# Patient Record
Sex: FEMALE | Race: WHITE | Employment: FULL TIME | URBAN - METROPOLITAN AREA
[De-identification: names, ages, dates, MRNs, and addresses within clinical notes are randomized per-mention and may not be internally consistent; named-entity substitution may affect disease eponyms.]

---

## 2017-01-26 DIAGNOSIS — G44.209 TENSION HEADACHE: Primary | ICD-10-CM

## 2017-01-26 RX ORDER — HYDROCODONE BITARTRATE AND ACETAMINOPHEN 5; 325 MG/1; MG/1
TABLET ORAL
Qty: 20 TABLET | Refills: 0 | Status: SHIPPED | OUTPATIENT
Start: 2017-01-26 | End: 2017-03-23

## 2017-01-26 NOTE — TELEPHONE ENCOUNTER
Norco      Last Written Prescription Date:  12/06/2016  Last Fill Quantity: 20,   # refills: 0  Last Office Visit with [facility], [facility] or [facility] prescribing provider: 07/06/2016  Future Office visit:       Routing refill request to provider for review/approval because:  Drug not on the [facility], [facility] or [facility] refill protocol or controlled substance    Viv Archibald, [facility]  Float Technician  Nehemias Moralez

## 2017-02-03 DIAGNOSIS — F41.0 PANIC DISORDER WITHOUT AGORAPHOBIA: Primary | ICD-10-CM

## 2017-02-03 RX ORDER — LORAZEPAM 0.5 MG/1
0.5 TABLET ORAL EVERY 8 HOURS PRN
Qty: 30 TABLET | Refills: 0 | Status: SHIPPED | OUTPATIENT
Start: 2017-02-03 | End: 2017-07-18

## 2017-02-03 NOTE — TELEPHONE ENCOUNTER
Script faxed over to the [facility] in [place].    Dayana Bowling, [facility]

## 2017-02-24 ENCOUNTER — TELEPHONE (OUTPATIENT)
Dept: FAMILY MEDICINE | Facility: CLINIC | Age: 52
End: 2017-02-24

## 2017-02-24 DIAGNOSIS — F33.0 MAJOR DEPRESSIVE DISORDER, RECURRENT EPISODE, MILD (H): Primary | ICD-10-CM

## 2017-02-24 NOTE — LETTER
[facility]  [address] [id number] [phone]      February 24, 2017      Linda Terrell  [address] [place] [id number]        Dear Linda,     As part of [place]'s commitment to health and wellness we have recently reviewed your chart and your medical record indicates that you are due for one or more of the following:    -- Questionnaires for depression and anxiety. Please fill out the attached questionnaires and send them back to us in the stamped envelope provided. These questions are about your depression and how you have been feeling in the last 2 weeks. We need these forms updated in your chart in order to continue to fill your medication. The score of these questionnaires helps to determine if your medications are working well. Thank you in advance for filling them out.    -- Enclosed is a Depression Action Plan. Please read through it and keep it for future reference.     -- Physical / Pap smear. The last pap that we have on file for you was from 2/6/2008. These are recommended every 3 years. Please call our clinic to schedule your pap smear / physical appointment. Also, plan to come fasting 8-10 hours for blood work.     -- Mammogram. Please call one of the following numbers to schedule:  [facility] [phone]  [facility] [phone]  [facility] [phone]  [facility] [phone]  [facility] [phone]  [facility] [phone]    -- Colonoscopy or FIT test. Please call one of the following numbers to schedule a colonoscopy:  [facility] [phone]  [facility] [phone]  [facility] [phone]  [facility] [phone] (multiple sites, call for locations)    A colonoscopy is the gold standard but if you are reluctant to do this there is a less invasive and less expensive alternative. FIT (fecal immunochemical testing) is a screening option that is performed on a yearly  basis. This is a test to check for blood in the stool, which can be performed in the comfort of your own home. If you are willing to perform this test, we can order the kit for you to  at our lab. When you have completed the test, you simply mail it in the pre-addressed envelope.    Please call us at [phone] if you need an order for a colonoscopy or FIT testing.      Please try to schedule and/or complete the tests above within the next 2-4 weeks.   The number to call to schedule an appointment at [facility] is [phone].    While we work hard to maintain accurate records on all our patients, it is always possible that this notice does not accurately reflect tests that you may have had. To ensure that we do not continue to send you notices please verify, at your next visit or by a [employer] message, that we have accurate dates of your tests, even if these were done many years ago.     Working together for your health is our goal.    Thank you for choosing [facility] for your healthcare needs.      Sincerely,     REBECCA Conrad / brian

## 2017-02-24 NOTE — LETTER
My Depression Action Plan  Name: Linda Terrell   Date of Birth 1965  Date: 2/24/2017    My doctor: Laquita Suresh   My clinic: [address] [address] [phone]          GREEN    ZONE   Good Control    What it looks like:     Things are going generally well. You have normal up s and down s. You may even feel depressed from time to time, but bad moods usually last less than a day.   What you need to do:  1. Continue to care for yourself (see self care plan)  2. Check your depression survival kit and update it as needed  3. Follow your physician s recommendations including any medication.  4. Do not stop taking medication unless you consult with your physician first.           YELLOW         ZONE Getting Worse    What it looks like:     Depression is starting to interfere with your life.     It may be hard to get out of bed; you may be starting to isolate yourself from others.    Symptoms of depression are starting to last most all day and this has happened for several days.     You may have suicidal thoughts but they are not constant.   What you need to do:     1. Call your care team, your response to treatment will improve if you keep your care team informed of your progress. Yellow periods are signs an adjustment may need to be made.     2. Continue your self-care, even if you have to fake it!    3. Talk to someone in your support network    4. Open up your depression survival kit           RED    ZONE Medical Alert - Get Help    What it looks like:     Depression is seriously interfering with your life.     You may experience these or other symptoms: You can t get out of bed most days, can t work or engage in other necessary activities, you have trouble taking care of basic hygiene, or basic responsibilities, thoughts of suicide or death that will not go away, self-injurious behavior.     What you need to do:  1. Call your care team  and request a same-day appointment. If they are not available (weekends or after hours) call your local crisis line, emergency room or 911.          Depression Self Care Plan / Survival Kit    Self-Care for Depression  Here s the deal. Your body and mind are really not as separate as most people think.  What you do and think affects how you feel and how you feel influences what you do and think. This means if you do things that people who feel good do, it will help you feel better.  Sometimes this is all it takes.  There is also a place for medication and therapy depending on how severe your depression is, so be sure to consult with your medical provider and/ or Behavioral Health Consultant if your symptoms are worsening or not improving.     In order to better manage my stress, I will:    Exercise  Get some form of exercise, every day. This will help reduce pain and release endorphins, the  feel good  chemicals in your brain. This is almost as good as taking antidepressants!  This is not the same as joining a gym and then never going! (they count on that by the way ) It can be as simple as just going for a walk or doing some gardening, anything that will get you moving.      Hygiene   Maintain good hygiene (Get out of bed in the morning, Make your bed, Brush your teeth, Take a shower, and Get dressed like you were going to work, even if you are unemployed).  If your clothes don't fit try to get ones that do.    Diet  I will strive to eat foods that are good for me, drink plenty of water, and avoid excessive sugar, caffeine, alcohol, and other mood-altering substances.  Some foods that are helpful in depression are: complex carbohydrates, B vitamins, flaxseed, fish or fish oil, fresh fruits and vegetables.    Psychotherapy  I agree to participate in Individual Therapy (if recommended).    Medication  If prescribed medications, I agree to take them.  Missing doses can result in serious side effects.  I understand  that drinking alcohol, or other illicit drug use, may cause potential side effects.  I will not stop my medication abruptly without first discussing it with my provider.    Staying Connected With Others  I will stay in touch with my friends, family members, and my primary care provider/team.    Use your imagination  Be creative.  We all have a creative side; it doesn t matter if it s oil painting, sand castles, or mud pies! This will also kick up the endorphins.    Witness Beauty  (AKA stop and smell the roses) Take a look outside, even in mid-winter. Notice colors, textures. Watch the squirrels and birds.     Service to others  Be of service to others.  There is always someone else in need.  By helping others we can  get out of ourselves  and remember the really important things.  This also provides opportunities for practicing all the other parts of the program.    Humor  Laugh and be silly!  Adjust your TV habits for less news and crime-drama and more comedy.    Control your stress  Try breathing deep, massage therapy, biofeedback, and meditation. Find time to relax each day.     My support system    Clinic Contact:  Phone number:    Contact 1:  Phone number:    Contact 2:  Phone number:    Muslim/:  Phone number:    Therapist:  Phone number:    Local crisis center:    Phone number:    Other community support:  Phone number:

## 2017-02-24 NOTE — TELEPHONE ENCOUNTER
Panel Management Review      Patient has the following on her problem list:     Depression / Dysthymia review  PHQ-9 SCORE 9/28/2015 12/2/2015 7/6/2016   Total Score - - -   Total Score 14 6 5      Patient is due for:  PHQ9 and DAP      Composite cancer screening  Chart review shows that this patient is due/due soon for the following Pap Smear, Mammogram and Colonoscopy  Summary:    Patient is due/failing the following:   Lipid, colonoscopy, mammo, pap, DAP, PHQ9, GAD7    Action needed:   Patient needs to do PHQ9 and GAD7, mammo, colonoscopy, DAP, physical/pap with fasting labs    Type of outreach:    Sent letter. - patient does not read her [employer] messages    Questions for provider review:    None                                                                                                                                    Maryellen MCCAULEY       Chart routed to none .

## 2017-03-23 DIAGNOSIS — G44.209 TENSION HEADACHE: ICD-10-CM

## 2017-03-23 RX ORDER — HYDROCODONE BITARTRATE AND ACETAMINOPHEN 5; 325 MG/1; MG/1
TABLET ORAL
Qty: 20 TABLET | Refills: 0 | Status: SHIPPED | OUTPATIENT
Start: 2017-03-23 | End: 2017-05-30

## 2017-03-23 NOTE — TELEPHONE ENCOUNTER
Norco      Last Written Prescription Date:  01/26/17  Last Fill Quantity: 20,   # refills: 0  Last Office Visit with [facility], [facility] or [facility] prescribing provider: 07/06/16  Future Office visit:       Routing refill request to provider for review/approval because:  Drug not on the [facility], [facility] or [facility] refill protocol or controlled substance

## 2017-03-24 NOTE — TELEPHONE ENCOUNTER
Script walked over to the [facility].    Dayana Bowling, [facility]

## 2017-04-24 ENCOUNTER — MYC REFILL (OUTPATIENT)
Dept: FAMILY MEDICINE | Facility: CLINIC | Age: 52
End: 2017-04-24

## 2017-04-24 DIAGNOSIS — F51.01 PRIMARY INSOMNIA: ICD-10-CM

## 2017-04-24 RX ORDER — AMITRIPTYLINE HYDROCHLORIDE 75 MG/1
75 TABLET ORAL AT BEDTIME
Qty: 90 TABLET | Refills: 1 | Status: SHIPPED | OUTPATIENT
Start: 2017-04-24 | End: 2017-08-11

## 2017-04-24 NOTE — TELEPHONE ENCOUNTER
Prescription approved per [facility] Refill Protocol.  Cheryl Hernandez RN

## 2017-04-24 NOTE — TELEPHONE ENCOUNTER
Message from Ket:  Original authorizing provider: OMAR SWIFT NP, APRN CNP    Linda Nidhi would like a refill of the following medications:  amitriptyline (ELAVIL) 75 MG tablet [OMAR SWIFT NP, APRN CNP]    Preferred pharmacy: [facility] [address]    Comment:

## 2017-05-27 ENCOUNTER — HEALTH MAINTENANCE LETTER (OUTPATIENT)
Age: 52
End: 2017-05-27

## 2017-05-30 DIAGNOSIS — G44.209 TENSION HEADACHE: ICD-10-CM

## 2017-05-30 RX ORDER — HYDROCODONE BITARTRATE AND ACETAMINOPHEN 5; 325 MG/1; MG/1
TABLET ORAL
Qty: 20 TABLET | Refills: 0 | Status: SHIPPED | OUTPATIENT
Start: 2017-05-30 | End: 2017-08-09

## 2017-05-30 NOTE — TELEPHONE ENCOUNTER
norco 5-325mg      Last Written Prescription Date:  03/23/17  Last Fill Quantity: 20,   # refills: 0  Last Office Visit with [facility], [facility] or [facility] prescribing provider: 07/06/16  Future Office visit:       Routing refill request to provider for review/approval because:  Drug not on the [facility], [facility] or [facility] refill protocol or controlled substance      On behalf of [facility]  Thank You~  Kathy Varghese CPhT  [facility]

## 2017-07-18 DIAGNOSIS — F41.0 PANIC DISORDER WITHOUT AGORAPHOBIA: ICD-10-CM

## 2017-07-18 RX ORDER — LORAZEPAM 0.5 MG/1
0.5 TABLET ORAL EVERY 8 HOURS PRN
Qty: 30 TABLET | Refills: 0 | Status: SHIPPED | OUTPATIENT
Start: 2017-07-18 | End: 2018-05-15

## 2017-07-18 NOTE — TELEPHONE ENCOUNTER
Script walked over to the [facility].    Dayana Bowling, [facility]

## 2017-07-18 NOTE — TELEPHONE ENCOUNTER
Lorazepam 0.5mg      Last Written Prescription Date:  2/3/17  Last Fill Quantity: 30,   # refills: 0  Last Office Visit with [facility], [facility] or [facility] prescribing provider: 7/6/16  Future Office visit:       Routing refill request to provider for review/approval because:  Drug not on the [facility], [facility] or [facility] refill protocol or controlled substance      Shabnam Herman CPhT  [id number] [address] [place] [address]  Phone: [phone]  Fax: [phone]

## 2017-08-09 ENCOUNTER — MYC REFILL (OUTPATIENT)
Dept: FAMILY MEDICINE | Facility: CLINIC | Age: 52
End: 2017-08-09

## 2017-08-09 DIAGNOSIS — G44.209 TENSION HEADACHE: ICD-10-CM

## 2017-08-09 NOTE — TELEPHONE ENCOUNTER
Message from Ket:  Original authorizing provider: LAQUITA SWIFT NP, APRN CNP    Linda Terrell would like a refill of the following medications:  HYDROcodone-acetaminophen (NORCO) 5-325 MG per tablet [LAQUITA SWIFT NP, APRN CNP]    Preferred pharmacy: [facility] [address]    Comment:  Appointment scheduled to see Laquita Friday 8/11/17 @ 3:20.

## 2017-08-10 DIAGNOSIS — G44.209 TENSION HEADACHE: ICD-10-CM

## 2017-08-10 RX ORDER — HYDROCODONE BITARTRATE AND ACETAMINOPHEN 5; 325 MG/1; MG/1
TABLET ORAL
Qty: 20 TABLET | Refills: 0 | Status: SHIPPED | OUTPATIENT
Start: 2017-08-10 | End: 2017-08-10

## 2017-08-10 RX ORDER — HYDROCODONE BITARTRATE AND ACETAMINOPHEN 5; 325 MG/1; MG/1
TABLET ORAL
Qty: 20 TABLET | Refills: 0 | Status: SHIPPED | OUTPATIENT
Start: 2017-08-10 | End: 2017-08-11

## 2017-08-10 NOTE — TELEPHONE ENCOUNTER
Hydrocodone/Apap 5/325mg      Last Written Prescription Date:  8/10/17  Last Fill Quantity: 20,   # refills: 0  Last Office Visit with [facility], [facility] or [facility] prescribing provider: 7/6/17  Future Office visit:    Next 5 appointments (look out 90 days)     Aug 11, 2017  3:20 PM CDT   Freya Lane with AUBREY Mendes CNP   [facility]    [address] [id number] [phone]                   Routing refill request to provider for review/approval because:  Drug not on the [facility], [facility] or [facility] refill protocol or controlled substance      Shabnam Herman CPhT  [id number])  [address] [place] [address]  Phone: [phone]  Fax: [phone]

## 2017-08-10 NOTE — TELEPHONE ENCOUNTER
Script walked over to the [facility].    Dayana Bowling, [facility]

## 2017-08-11 ENCOUNTER — OFFICE VISIT (OUTPATIENT)
Dept: FAMILY MEDICINE | Facility: CLINIC | Age: 52
End: 2017-08-11
Payer: COMMERCIAL

## 2017-08-11 VITALS
HEART RATE: 64 BPM | HEIGHT: 64 IN | WEIGHT: 140.6 LBS | TEMPERATURE: 97.8 F | BODY MASS INDEX: 24.01 KG/M2 | SYSTOLIC BLOOD PRESSURE: 134 MMHG | DIASTOLIC BLOOD PRESSURE: 82 MMHG

## 2017-08-11 DIAGNOSIS — Z11.59 NEED FOR HEPATITIS C SCREENING TEST: ICD-10-CM

## 2017-08-11 DIAGNOSIS — R73.09 ELEVATED GLUCOSE: ICD-10-CM

## 2017-08-11 DIAGNOSIS — Z12.11 SPECIAL SCREENING FOR MALIGNANT NEOPLASMS, COLON: ICD-10-CM

## 2017-08-11 DIAGNOSIS — F11.90 CHRONIC NARCOTIC USE: ICD-10-CM

## 2017-08-11 DIAGNOSIS — G44.209 TENSION HEADACHE: Primary | ICD-10-CM

## 2017-08-11 DIAGNOSIS — Z13.6 CARDIOVASCULAR SCREENING; LDL GOAL LESS THAN 160: ICD-10-CM

## 2017-08-11 DIAGNOSIS — Z12.31 VISIT FOR SCREENING MAMMOGRAM: ICD-10-CM

## 2017-08-11 DIAGNOSIS — F51.01 PRIMARY INSOMNIA: ICD-10-CM

## 2017-08-11 DIAGNOSIS — S46.819D STRAIN OF TRAPEZIUS MUSCLE, UNSPECIFIED LATERALITY, SUBSEQUENT ENCOUNTER: ICD-10-CM

## 2017-08-11 PROCEDURE — 99214 OFFICE O/P EST MOD 30 MIN: CPT | Performed by: NURSE PRACTITIONER

## 2017-08-11 RX ORDER — AMITRIPTYLINE HYDROCHLORIDE 75 MG/1
75 TABLET ORAL AT BEDTIME
Qty: 90 TABLET | Refills: 1 | Status: SHIPPED | OUTPATIENT
Start: 2017-09-12 | End: 2018-04-26

## 2017-08-11 RX ORDER — HYDROCODONE BITARTRATE AND ACETAMINOPHEN 5; 325 MG/1; MG/1
TABLET ORAL
Qty: 20 TABLET | Refills: 0 | Status: SHIPPED | OUTPATIENT
Start: 2017-09-22 | End: 2017-11-16

## 2017-08-11 ASSESSMENT — ANXIETY QUESTIONNAIRES
6. BECOMING EASILY ANNOYED OR IRRITABLE: NOT AT ALL
5. BEING SO RESTLESS THAT IT IS HARD TO SIT STILL: NOT AT ALL
3. WORRYING TOO MUCH ABOUT DIFFERENT THINGS: SEVERAL DAYS
7. FEELING AFRAID AS IF SOMETHING AWFUL MIGHT HAPPEN: NOT AT ALL
1. FEELING NERVOUS, ANXIOUS, OR ON EDGE: SEVERAL DAYS
2. NOT BEING ABLE TO STOP OR CONTROL WORRYING: SEVERAL DAYS
GAD7 TOTAL SCORE: 3

## 2017-08-11 ASSESSMENT — PATIENT HEALTH QUESTIONNAIRE - PHQ9
SUM OF ALL RESPONSES TO PHQ QUESTIONS 1-9: 3
5. POOR APPETITE OR OVEREATING: NOT AT ALL

## 2017-08-11 NOTE — PROGRESS NOTES
SUBJECTIVE:                                                    Linda Terrell is a 52 year old female who presents to clinic today for the following health issues:      Medication Followup of Norco    Taking Medication as prescribed: yes    Side Effects:  None    Medication Helping Symptoms:  yes     Depression and Anxiety Follow-Up    Status since last visit: Stable    Other associated symptoms:None    Complicating factors:     Significant life event: No     Current substance abuse: None    PHQ-9 SCORE 9/28/2015 12/2/2015 7/6/2016   Total Score - - -   Total Score 14 6 5     ELISA-7 SCORE 10/22/2013 7/1/2014 9/28/2015   Total Score 2 0 -   Total Score - - 17       PHQ-9  English  PHQ-9   Any Language  GAD7    Amount of exercise or physical activity: Is active but does no formal exercise    Problems taking medications regularly: No    Medication side effects: none  Diet: regular (no restrictions)         Last week had all of the symptoms of period but didn't have period.   Does have headache a week before her period.  ;will usually be behind the left eye.    This last headache was the worse headache that she has hd   Bright lights are painful.   Does have hx of tension headaches is coming a week before periods, can last for  3-4 days . but now is having a worse headache that is behind the left. Will some time   Periods are changing a little .  Did miss period last month but did have all of the symptoms   For the  Headache will take a lot of Ibuprofen .  Hot rice pack to the back of the neck   When at home has used 1/2 Norco and that will help to stop the headache    Lately has been over thinking things.  Daughter had a second surgery for her thyroid cancer  Is stressed out.   Eats well doesn't drink much caffeine ,  Is staying away from triggers ( chocolate )     Depression and anxiety is well controlled   Has been a tough year but is going well    Problem list and histories reviewed & adjusted, as  indicated.  Additional history: as documented    Patient Active Problem List   Diagnosis     INTERMITTENTLY ELEVATED BP WITH SWEATS     Panic disorder without agoraphobia     Uterovaginal prolapse, incomplete     CARDIOVASCULAR SCREENING; LDL GOAL LESS THAN 160     24 hour contact handout given     Tension headache     Insomnia     SI (sacroiliac) joint dysfunction     Elevated glucose     Major depressive disorder, recurrent episode, mild (H)     Adjustment disorder with depressed mood     Panic attack as reaction to stress     Chronic pain     Past Surgical History:   Procedure Laterality Date     BUNIONECTOMY RT/LT      left     C TMJ UNSPECIFIED THERAPY      surgery on jaw     TUBAL LIGATION         Social History   Substance Use Topics     Smoking status: Never Smoker     Smokeless tobacco: Never Used     Alcohol use Yes      Comment: occasional     Family History   Problem Relation Age of Onset     Breast Cancer Mother      Hypertension Father      CEREBROVASCULAR DISEASE Paternal Grandfather      Respiratory Paternal Grandmother      Emphysema     C.A.D. No family hx of      DIABETES No family hx of      Cancer - colorectal No family hx of      Prostate Cancer No family hx of          Current Outpatient Prescriptions   Medication Sig Dispense Refill     HYDROcodone-acetaminophen (NORCO) 5-325 MG per tablet Take 1 tablet at onset of   severe tension headache,  Taking no more than 2 per headache  Try to make them last  For 6-8 weeks  Last refill until seen -8/10/17. Due for physical 20 tablet 0     LORazepam (ATIVAN) 0.5 MG tablet Take 1 tablet (0.5 mg) by mouth every 8 hours as needed for anxiety Use sparingly  Over due for appointment 30 tablet 0     amitriptyline (ELAVIL) 75 MG tablet Take 1 tablet (75 mg) by mouth At Bedtime 90 tablet 1     sertraline (ZOLOFT) 50 MG tablet 1 1/2 tablets the week before period  And 1 tablet daily the rest of the month 34 tablet 6     Allergies   Allergen Reactions      "Codeine Itching     Can take vicodin.      Imitrex [Sumatriptan] Other (See Comments)     Makes her headaches worse.      BP Readings from Last 3 Encounters:   08/11/17 134/82   07/06/16 115/70   02/09/16 135/89    Wt Readings from Last 3 Encounters:   08/11/17 140 lb 9.6 oz (63.8 kg)   07/06/16 145 lb (65.8 kg)   02/09/16 148 lb 3.2 oz (67.2 kg)                        Reviewed and updated as needed this visit by clinical staffTobacco  Allergies  Meds  Med Hx  Surg Hx  Fam Hx  Soc Hx      Reviewed and updated as needed this visit by Provider  Tobacco  Allergies  Meds  Med Hx  Surg Hx  Fam Hx  Soc Hx        ROS:  C: NEGATIVE for fever, chills, change in weight  E/M: NEGATIVE for ear, mouth and throat problems  R: NEGATIVE for significant cough or SOB  CV: NEGATIVE for chest pain, palpitations or peripheral edema   POSITIVE miss period,  This is the age that mother went into menopause   GI: NEGATIVE for nausea, abdominal pain, heartburn, or change in bowel habits  NEURO: POSITIVE for will have headache the week before her period.  Mostly on the left side   Feels that lately her headaches are more intense doesn't want to have a CT scan   [place] will take away the headache  PSYCHIATRIC: POSITIVE for stress related to daughter's thyroid cancer.  Just had a second surgery   Is going through a divorce .    Personally she is doing OK     OBJECTIVE:     /82 (BP Location: Right arm, Patient Position: Chair, Cuff Size: Adult Regular)  Pulse 64  Temp 97.8  F (36.6  C) (Oral)  Ht 5' 4.25\" (1.632 m)  Wt 140 lb 9.6 oz (63.8 kg)  BMI 23.95 kg/m2  Body mass index is 23.95 kg/(m^2).   GENERAL: healthy, alert and no distress  HENT: ear canals and TM's normal, nose and mouth without ulcers or lesions  NECK: no adenopathy, no asymmetry, masses, or scars and thyroid normal to palpation  RESP: lungs clear to auscultation - no rales, rhonchi or wheezes  CV: regular rate and rhythm, normal S1 S2, no S3 or S4, no " murmur, click or rub, no peripheral edema and peripheral pulses strong  MS: the trap muscle are very tight  The  Cervical muscles are also very tight and knotted    NEURO: Normal strength and tone, mentation intact and speech normal  PSYCH: affect normal/bright, anxious, appearance well groomed and will talk in circles.       Diagnostic Test Results:  none     ASSESSMENT/PLAN:     ASSESSMENT/PLAN:      ICD-10-CM    1. Tension headache G44.209 [facility] REFERRAL     HYDROcodone-acetaminophen (NORCO) 5-325 MG per tablet   2. Need for hepatitis C screening test Z11.59 **Hepatitis C Screen Reflex to RNA FUTURE anytime   3. CARDIOVASCULAR SCREENING; LDL GOAL LESS THAN 160 Z13.6 Lipid panel reflex to direct LDL   4. Visit for screening mammogram Z12.31 MA Screening Digital Bilateral   5. Special screening for malignant neoplasms, colon Z12.11 Fecal colorectal cancer screen (FIT)   6. Elevated glucose R73.09 Basic metabolic panel  (Ca, Cl, CO2, Creat, Gluc, K, Na, BUN)     Hemoglobin A1c   7. Strain of trapezius muscle, unspecified laterality, subsequent encounter S46.819D [facility], [facility] CH[facility]RACT[facility] REFERRAL   8. Chronic narcotic use F11.90 Drug Abuse Screen Panel 13, Urine (Pain Care Package)   9. Primary insomnia F51.01 amitriptyline (ELAVIL) 75 MG tablet       Patient Instructions   It does sound like you are going into menopause .   Stay well hydrated - urine should be clear.    Exercise 30-60 min daily   Your menopause generally will be close to what your mother's is.     For the headaches do go to  [facility] physical therapy to get the muscles in the neck loosened up to decrease your headaches     You will need to make a lab only appointment,  [phone].  Do this by  Aug 24, 2017 .,  Please be fasting                    MEDICATIONS:  Continue current medications without change  Work on weight loss  Regular exercise  See Patient Instructions    OMAR SWIFT NP, APRN [facility]  [facility]

## 2017-08-11 NOTE — NURSING NOTE
"Chief Complaint   Patient presents with     Recheck Medication     Norco       Initial /82 (BP Location: Right arm, Patient Position: Chair, Cuff Size: Adult Regular)  Pulse 64  Temp 97.8  F (36.6  C) (Oral)  Ht 5' 4.25\" (1.632 m)  Wt 140 lb 9.6 oz (63.8 kg)  BMI 23.95 kg/m2 Estimated body mass index is 23.95 kg/(m^2) as calculated from the following:    Height as of this encounter: 5' 4.25\" (1.632 m).    Weight as of this encounter: 140 lb 9.6 oz (63.8 kg).  Medication Reconciliation: complete     Margi Ramos CMA (AAMA)      "

## 2017-08-11 NOTE — MR AVS SNAPSHOT
After Visit Summary   8/11/2017    Linda Terrell    MRN: [id number]           Patient Information     Date Of Birth          1965        Visit Information        Provider Department      8/11/2017 3:20 PM Laquita Suresh APRN [facility]        Today's Diagnoses     Tension headache    -  1    Need for hepatitis C screening test        CARDIOVASCULAR SCREENING; LDL GOAL LESS THAN 160        Visit for screening mammogram        Special screening for malignant neoplasms, colon        Elevated glucose        Strain of trapezius muscle, unspecified laterality, subsequent encounter        Chronic narcotic use        Primary insomnia          Care Instructions    It does sound like you are going into menopause .   Stay well hydrated - urine should be clear.    Exercise 30-60 min daily   Your menopause generally will be close to what your mother's is.     For the headaches do go to  [facility] physical therapy to get the muscles in the neck loosened up to decrease your headaches     You will need to make a lab only appointment,  [phone].  Do this by  Aug 24, 2017 .,  Please be fasting             Follow-ups after your visit        Additional Services     [facility] PT, HAND, AND CHIROPRACTIC REFERRAL       **This order will print in the [facility] Scheduling Office**    Physical Therapy, Hand Therapy and Chiropractic Care are available through:    *[facility]  *[facility]  *[facility]    Call one number to schedule at any of the above locations: [phone].    Your provider has referred you to: Physical Therapy at [facility] or [facility]    Indication/Reason for Referral: trap muscle pain ,    Onset of Illness: chronic   Therapy Orders: Evaluate and Treat  Special Programs: None  Special Request: Rachelle Villaseñor      Additional Comments for the Therapist or Chiropractor:     Please be aware that coverage of these services is subject to  the terms and limitations of your health insurance plan.  Call member services at your health plan with any benefit or coverage questions.      Please bring the following to your appointment:    *Your personal calendar for scheduling future appointments  *Comfortable clothing                  Future tests that were ordered for you today     Open Future Orders        Priority Expected Expires Ordered    **Hepatitis C Screen Reflex to RNA FUTURE anytime Routine 8/11/2017 8/24/2017 8/11/2017    Lipid panel reflex to direct LDL Routine  8/24/2017 8/11/2017    MA Screening Digital Bilateral Routine  8/12/2018 8/11/2017    Fecal colorectal cancer screen (FIT) Routine 9/1/2017 11/3/2017 8/11/2017    Basic metabolic panel  (Ca, Cl, CO2, Creat, Gluc, K, Na, BUN) Routine  8/24/2017 8/11/2017    Hemoglobin A1c Routine  8/24/2017 8/11/2017            Who to contact     Normal or non-critical lab and imaging results will be communicated to you by "[employer]hart, letter or phone within 4 business days after the clinic has received the results. If you do not hear from us within 7 days, please contact the clinic through [employer] or phone. If you have a critical or abnormal lab result, we will notify you by phone as soon as possible.  Submit refill requests through [employer] or call your pharmacy and they will forward the refill request to us. Please allow 3 business days for your refill to be completed.          If you need to speak with a  for additional information , please call: [phone]           Additional Information About Your Visit        [employer] Information     [employer] gives you secure access to your electronic health record. If you see a primary care provider, you can also send messages to your care team and make appointments. If you have questions, please call your primary care clinic.  If you do not have a primary care provider, please call [phone] and they will assist you.        Care EveryWhere ID      "This is your Care EveryWhere ID. This could be used by other organizations to access your [place] medical records  [id number]        Your Vitals Were     Pulse Temperature Height BMI (Body Mass Index)          64 97.8  F (36.6  C) (Oral) 5' 4.25\" (1.632 m) 23.95 kg/m2         Blood Pressure from Last 3 Encounters:   08/11/17 134/82   07/06/16 115/70   02/09/16 135/89    Weight from Last 3 Encounters:   08/11/17 140 lb 9.6 oz (63.8 kg)   07/06/16 145 lb (65.8 kg)   02/09/16 148 lb 3.2 oz (67.2 kg)              We Performed the Following     Drug Abuse Screen Panel 13, Urine (Pain Care Package)     [facility] PT, HAND, AND CHIROPRACTIC REFERRAL          Today's Medication Changes          These changes are accurate as of: 8/11/17  4:34 PM.  If you have any questions, ask your nurse or doctor.               These medicines have changed or have updated prescriptions.        Dose/Directions    HYDROcodone-acetaminophen 5-325 MG per tablet   Commonly known as:  NORCO   This may have changed:  additional instructions   Used for:  Tension headache   Changed by:  Laquita Suresh APRN CNP        Start taking on:  9/22/2017   Take 1 tablet at onset of   severe tension headache,  Taking no more than 2 per headache  Try to make them last  For 6-8 weeks   Quantity:  20 tablet   Refills:  0            Where to get your medicines      These medications were sent to [facility] [id number] [place] [address] [address] [place] [address] [facility] [address]     Phone:  [phone]     amitriptyline 75 MG tablet         Some of these will need a paper prescription and others can be bought over the counter.  Ask your nurse if you have questions.     Bring a paper prescription for each of these medications     HYDROcodone-acetaminophen 5-325 MG per tablet                Primary Care Provider Office Phone # Fax #    AUBREY Mendes [phone] [place] [address]        Equal Access to Services     [facility] ParkFLAVIO : Hadii aad ku hadbarrieulisses Sotin, waaxda luqadaha, qaybta kaalmamichel de oliveira, kaetlin holbrook. So [facility] [phone].    ATENCIÓN: Si habla español, tiene a cleaning disposición servicios gratuitos de asistencia lingüística. [facility] [phone].    We comply with applicable federal civil rights laws and [place] laws. We do not discriminate on the basis of race, color, national origin, age, disability sex, sexual orientation or gender identity.            Thank you!     Thank you for choosing [facility]  for your care. Our goal is always to provide you with excellent care. Hearing back from our patients is one way we can continue to improve our services. Please take a few minutes to complete the written survey that you may receive in the mail after your visit with us. Thank you!             Your Updated Medication List - Protect others around you: Learn how to safely use, store and throw away your medicines at www.disposemymeds.org.          This list is accurate as of: 8/11/17  4:34 PM.  Always use your most recent med list.                   Brand Name Dispense Instructions for use Diagnosis    amitriptyline 75 MG tablet   Start taking on:  9/12/2017    ELAVIL    90 tablet    Take 1 tablet (75 mg) by mouth At Bedtime    Primary insomnia       HYDROcodone-acetaminophen 5-325 MG per tablet   Start taking on:  9/22/2017    NORCO    20 tablet    Take 1 tablet at onset of   severe tension headache,  Taking no more than 2 per headache  Try to make them last  For 6-8 weeks    Tension headache       LORazepam 0.5 MG tablet    ATIVAN    30 tablet    Take 1 tablet (0.5 mg) by mouth every 8 hours as needed for anxiety Use sparingly  Over due for appointment    Panic disorder without agoraphobia       sertraline 50 MG tablet    ZOLOFT    34 tablet    1 1/2 tablets the week before period  And 1 tablet daily  the rest of the month    Adjustment disorder with depressed mood

## 2017-08-11 NOTE — PATIENT INSTRUCTIONS
It does sound like you are going into menopause .   Stay well hydrated - urine should be clear.    Exercise 30-60 min daily   Your menopause generally will be close to what your mother's is.     For the headaches do go to  [facility] physical therapy to get the muscles in the neck loosened up to decrease your headaches     You will need to make a lab only appointment,  [phone].  Do this by  Aug 24, 2017 .,  Please be fasting

## 2017-08-11 NOTE — LETTER
[facility]    08/11/17    Patient: Linda Terrell  YOB: 1965  Medical Record Number: [id number]                                                                  Controlled Substance Agreement  I understand that my care provider has prescribed controlled substances (narcotics, tranquilizers, and/or stimulants) to help manage my condition(s).  I am taking this medicine to help me function or work.  I know that this is strong medicine.  It could have serious side effects and even cause a dependency on the drug.  If I stop these medicines suddenly, I could have severe withdrawal symptoms.    The risks, benefits, and side effects of these medication(s) were explained to me.  I agree that:  1. I will take part in other treatments as advised by my provider.  This may be psychiatry or counseling, physical therapy, behavioral therapy, group treatment, or a referral to a pain clinic.  I will reduce or stop my medicine when my provider tells me to do so.   2. I will take my medicines as prescribed.  I will not change the dose or schedule unless my provider tells me to.  There will be no refills if I  run out early.   I may be contacted at any time without warning and asked to complete a drug test or pill count.   3. I will keep all my appointments at the clinic.  If I miss appointments or fail to follow instructions, my provider may stop my medicine.  4. I will not ask other providers to prescribe controlled substances. And I will not accept controlled substances from other people. If I need another prescribed controlled substance for a new reason, I will notify my provider within one business day.  5. If I enroll in the [place] Medical Marijuana program, I will tell my provider.  I will also sign an agreement to share my medical records with my provider.  6. I will use one pharmacy to fill all of my controlled substance prescriptions.  If my prescription is mailed to my pharmacy, it may take 5  to 7 days for my medicine to be ready.  7. I understand that my provider, clinic care team, and pharmacy can track controlled substance prescriptions from other providers through a central database (prescription monitoring program).  8. I will bring in my list of medications (or my medicine bottles) each time I come to the clinic.  REV- 04/2016                                                                                                                                            Page 1 of 2      [facility]    08/11/17    Patient: Linda Terrell  YOB: 1965  Medical Record Number: [id number]    9. Refills of controlled substances will be made only during office hours.  It is up to me to make sure that I do not run out of my medicines on weekends or holidays.    10. I am responsible for my prescriptions.  If the medicine is lost or stolen, it will not be replaced.   I also agree not to share these medicines with anyone.  11. I agree to not use ANY illegal or recreational drugs.  This includes marijuana, cocaine, bath salts or other drugs.  I agree not to use alcohol unless my provider says I may.  I agree to give urine samples whenever asked.  If I fail to give a urine sample, the provider may stop my medicine.     12. I will tell my nurse or provider right away if I become pregnant or have a new medical problem treated outside of [facility].  13. I understand that this medicine can affect my thinking and judgment.  It may be unsafe for me to drive, use machinery and do dangerous tasks.  I will not do any of these things until I know how the medicine affects me.  If my dose changes, I will wait to see how it affects me.  I will contact my provider if I have concerns about medicine side effects.  I understand that if I do not follow any of the conditions above, my prescriptions or treatment may be stopped.    I agree that my provider, clinic care team, and pharmacy may work with any  city, state or federal law enforcement agency that investigates the misuse, sale, or other diversion of my controlled medicine. I will allow my provider to discuss my care with or share a copy of this agreement with any other treating provider, pharmacy or emergency room where I receive care.  I agree to give up (waive) any right of privacy or confidentiality with respect to these authorizations.   I have read this agreement and have asked questions about anything I did not understand.   ___________________________________    ___________________________  Patient Signature                                                           Date and Time  ___________________________________     ____________________________  Witness                                                                            Date and Time  ___________________________________  OMAR SWIFT NP, APRN CNP  REV-  04/2016                                                                                                                                                                 Page 2 of 2  {Controlled Substance Agreement (CSA) Patient Instructions (Optional):115393}

## 2017-08-12 ASSESSMENT — ANXIETY QUESTIONNAIRES: GAD7 TOTAL SCORE: 3

## 2017-08-29 DIAGNOSIS — F43.21 ADJUSTMENT DISORDER WITH DEPRESSED MOOD: ICD-10-CM

## 2017-08-30 NOTE — TELEPHONE ENCOUNTER
Called and spoke with patient , she states she has been taking it everyday  1 tab 50mg and states it was discussed at last appt with Kerwin to continue  Advised new RX sent to reflect how she is taking now  Will f./u at need   KAUSHIK Lo  Clinic  RN/Nehemias Moralez

## 2017-08-30 NOTE — TELEPHONE ENCOUNTER
sertraline (ZOLOFT) 50 MG tablet     Last Written Prescription Date: 7/6/16  Last Fill Quantity: 34, # refills: 6  Last Office Visit with G primary care provider:  8/11/17        Last PHQ-9 score on record=   PHQ-9 SCORE 8/11/2017   Total Score -   Total Score 3

## 2017-10-20 ENCOUNTER — TELEPHONE (OUTPATIENT)
Dept: FAMILY MEDICINE | Facility: CLINIC | Age: 52
End: 2017-10-20

## 2017-11-16 ENCOUNTER — MYC REFILL (OUTPATIENT)
Dept: FAMILY MEDICINE | Facility: CLINIC | Age: 52
End: 2017-11-16

## 2017-11-16 DIAGNOSIS — G44.209 TENSION HEADACHE: ICD-10-CM

## 2017-11-16 NOTE — TELEPHONE ENCOUNTER
Patient called wanting to know when med will be filled and if you received her mychart request.    Dayana Bowling [facility]

## 2017-11-16 NOTE — TELEPHONE ENCOUNTER
Message from HomeStayzechariaht:  Original authorizing provider: OMAR SWIFT NP, AUBREY CNP    Linda Terrell would like a refill of the following medications:  HYDROcodone-acetaminophen (NORCO) 5-325 MG per tablet [OMAR SWIFT NP, AUBREY CNP]    Preferred pharmacy: [facility] [address] [place] [address] [address] AT [facility]    Comment:

## 2017-11-17 RX ORDER — HYDROCODONE BITARTRATE AND ACETAMINOPHEN 5; 325 MG/1; MG/1
TABLET ORAL
Qty: 20 TABLET | Refills: 0 | Status: SHIPPED | OUTPATIENT
Start: 2017-11-17 | End: 2018-01-25

## 2017-11-17 RX ORDER — HYDROCODONE BITARTRATE AND ACETAMINOPHEN 5; 325 MG/1; MG/1
TABLET ORAL
Qty: 20 TABLET | Refills: 0 | Status: SHIPPED | OUTPATIENT
Start: 2017-11-17 | End: 2017-11-17

## 2017-11-17 NOTE — TELEPHONE ENCOUNTER
RX walked over to the [facility].    Dayana Bowling, [facility]

## 2017-11-30 ENCOUNTER — TELEPHONE (OUTPATIENT)
Dept: FAMILY MEDICINE | Facility: CLINIC | Age: 52
End: 2017-11-30

## 2017-11-30 NOTE — TELEPHONE ENCOUNTER
"11/30/2017      Patient Communication Preferences indicate  Do not contact  and/or communication by \"Phone\" is not preferred. Call not required per Outreach team.          Outreach ,  Louie Danielle    "

## 2018-01-25 ENCOUNTER — MYC REFILL (OUTPATIENT)
Dept: FAMILY MEDICINE | Facility: CLINIC | Age: 53
End: 2018-01-25

## 2018-01-25 DIAGNOSIS — G44.209 TENSION HEADACHE: ICD-10-CM

## 2018-01-26 RX ORDER — HYDROCODONE BITARTRATE AND ACETAMINOPHEN 5; 325 MG/1; MG/1
TABLET ORAL
Qty: 20 TABLET | Refills: 0 | Status: SHIPPED | OUTPATIENT
Start: 2018-01-26 | End: 2018-03-29

## 2018-01-26 NOTE — TELEPHONE ENCOUNTER
Message from [employer]:  Original authorizing provider: Oscar Mcguire MD    Linda Terrell would like a refill of the following medications:  HYDROcodone-acetaminophen (NORCO) 5-325 MG per tablet [Oscar Mcguire MD]    Preferred pharmacy: [facility] [address] [place] [address]  AT [facility]    Comment:

## 2018-03-29 ENCOUNTER — MYC REFILL (OUTPATIENT)
Dept: FAMILY MEDICINE | Facility: CLINIC | Age: 53
End: 2018-03-29

## 2018-03-29 DIAGNOSIS — G44.209 TENSION HEADACHE: ICD-10-CM

## 2018-03-29 RX ORDER — HYDROCODONE BITARTRATE AND ACETAMINOPHEN 5; 325 MG/1; MG/1
TABLET ORAL
Qty: 20 TABLET | Refills: 0 | OUTPATIENT
Start: 2018-03-29

## 2018-03-29 RX ORDER — HYDROCODONE BITARTRATE AND ACETAMINOPHEN 5; 325 MG/1; MG/1
TABLET ORAL
Qty: 20 TABLET | Refills: 0 | Status: SHIPPED | OUTPATIENT
Start: 2018-03-29 | End: 2018-07-09

## 2018-03-29 NOTE — TELEPHONE ENCOUNTER
Message from TicketBoxzechariaht:  Original authorizing provider: OMAR SWIFT NP, APRN CNP    Linda Terrell would like a refill of the following medications:  HYDROcodone-acetaminophen (NORCO) 5-325 MG per tablet [OMAR SWIFT NP, APRN CNP]    Preferred pharmacy: [facility] [address] [facility]    Comment:

## 2018-03-29 NOTE — TELEPHONE ENCOUNTER
Message from Magiqzechariaht:  Original authorizing provider: OMAR SWIFT NP, AUBREY CNP    Linda Terrell would like a refill of the following medications:  HYDROcodone-acetaminophen (NORCO) 5-325 MG per tablet [OMAR SWIFT NP, AUBREY CNP]    Preferred pharmacy: [facility] [address] [place] [address] [address] AT [facility]    Comment:

## 2018-03-30 ENCOUNTER — TELEPHONE (OUTPATIENT)
Dept: FAMILY MEDICINE | Facility: CLINIC | Age: 53
End: 2018-03-30

## 2018-03-30 NOTE — TELEPHONE ENCOUNTER
Received a Third Party Rejection from the [facility] for Coffey 5/325    Request routed to the [employer] epa team        Quan Marshall RT (r)  [facility]

## 2018-03-30 NOTE — TELEPHONE ENCOUNTER
Script walked over to the [facility].    Dayana Bowling, [facility]

## 2018-04-02 ENCOUNTER — TELEPHONE (OUTPATIENT)
Dept: FAMILY MEDICINE | Facility: CLINIC | Age: 53
End: 2018-04-02

## 2018-04-02 NOTE — TELEPHONE ENCOUNTER
Panel Management Review      Patient has the following on her problem list:     Depression / Dysthymia review    Measure:  Needs PHQ-9 score of 4 or less during index window.  Administer PHQ-9 and if score is 5 or more, send encounter to provider for next steps.    5 - 7 month window range: Pt not on depression roster only listed in HM.    PHQ-9 SCORE 12/2/2015 7/6/2016 8/11/2017   Total Score - - -   Total Score 6 5 3       If PHQ-9 recheck is 5 or more, route to provider for next steps.    Patient is due for:  PHQ9 and DAP      Composite cancer screening  Chart review shows that this patient is due/due soon for the following Pap Smear, Mammogram and Colonoscopy  Summary:    Patient is due/failing the following:   COLONOSCOPY, DAP, MAMMOGRAM, PAP and PHQ9    Action needed:   Patient needs office visit for Pap.    Type of outreach:    Sent Row44t message.    Questions for provider review:    None                                                                                                                                    Terri Washburn MA

## 2018-04-03 NOTE — TELEPHONE ENCOUNTER
PA Initiation    Medication: Hydrocodone/APAP 5-325 mg Tabs   Insurance Company: [employer] - Phone [phone] Fax [phone]  Pharmacy Filling the Rx[place] [facility] [place] [address] [facility]  Filling Pharmacy Phone: [phone]  Filling Pharmacy Fax:    Start Date: 4/3/2018

## 2018-04-03 NOTE — TELEPHONE ENCOUNTER
Insurance requested additional information in regards to the Prior Authorization. Form completed and faxed back.

## 2018-04-04 NOTE — TELEPHONE ENCOUNTER
Prior Authorization Approval    Authorization Effective Date: 4/4/2018  Authorization Expiration Date: 10/1/2018  Medication: Hydrocodone/APAP 5-325 mg Tabs - APPROVED   Approved Dose/Quantity:  Reference #: [id number]   Insurance Company: [employer] - Phone [phone] Fax [phone]  Expected CoPay: Not available      CoPay Card Available:      Foundation Assistance Needed:    Which Pharmacy is filling the prescription (Not needed for infusion/clinic administered): [facility] [place] - [address] [facility]  Pharmacy Notified: Yes  Patient Notified: No

## 2018-04-05 NOTE — TELEPHONE ENCOUNTER
Follow up notification with the pharmacy.      Quan Marshall RT (r)  [facility]

## 2018-04-26 DIAGNOSIS — F51.01 PRIMARY INSOMNIA: ICD-10-CM

## 2018-04-27 RX ORDER — AMITRIPTYLINE HYDROCHLORIDE 75 MG/1
TABLET ORAL
Qty: 90 TABLET | Refills: 0 | Status: SHIPPED | OUTPATIENT
Start: 2018-04-27 | End: 2018-07-23

## 2018-04-27 NOTE — TELEPHONE ENCOUNTER
"Requested Prescriptions   Pending Prescriptions Disp Refills     amitriptyline (ELAVIL) 75 MG tablet [Pharmacy Med Name: AMITRIPTYLINE 75MG TABLETS] 90 tablet 0    Last Written Prescription Date:  09/12/2017 #90 x 1  Last filled 03/26/2018  Last office visit: 8/11/2017 DU Suresh   Future Office Visit:  None   Sig: TAKE 1 TABLET(75 MG) BY MOUTH AT BEDTIME    Tricyclic Agents ( Annual appt and no PHQ9) Passed    4/26/2018  6:38 PM       Passed - Blood Pressure under 140/90 in past 12 mos    BP Readings from Last 3 Encounters:   08/11/17 134/82   07/06/16 115/70   02/09/16 135/89                Passed - Recent (12 mo) or future (30 days) visit within authorizing provider's specialty    Patient had office visit in the last 12 months or has a visit in the next 30 days with authorizing provider or within the authorizing provider's specialty.  See \"Patient Info\" tab in inbasket, or \"Choose Columns\" in Meds & Orders section of the refill encounter.           Passed - Patient is age 18 or older       Passed - Patient is not pregnant       Passed - No positive pregnancy test on record in past 12 mos          "

## 2018-04-27 NOTE — TELEPHONE ENCOUNTER
Routing refill request to provider for review/approval because:  Drug interaction warning    Marisol Eric RN

## 2018-05-15 ENCOUNTER — MYC REFILL (OUTPATIENT)
Dept: FAMILY MEDICINE | Facility: CLINIC | Age: 53
End: 2018-05-15

## 2018-05-15 DIAGNOSIS — F41.0 PANIC DISORDER WITHOUT AGORAPHOBIA: ICD-10-CM

## 2018-05-16 ENCOUNTER — MYC REFILL (OUTPATIENT)
Dept: FAMILY MEDICINE | Facility: CLINIC | Age: 53
End: 2018-05-16

## 2018-05-16 DIAGNOSIS — F41.0 PANIC DISORDER WITHOUT AGORAPHOBIA: ICD-10-CM

## 2018-05-16 NOTE — TELEPHONE ENCOUNTER
Routing refill request to provider for review/approval because:  Drug not on the [facility] refill protocol or controlled substance  PRachel Lo  Clinic  RN/Nehemias Moralez

## 2018-05-16 NOTE — TELEPHONE ENCOUNTER
Message from Carmenhart:  Original authorizing provider: OMAR SWIFT NP, APRN CNP    Linda Nidhi would like a refill of the following medications:  LORazepam (ATIVAN) 0.5 MG tablet [OMAR SWIFT NP, APRN CNP]    Preferred pharmacy: [facility] [address] [facility]    Comment:

## 2018-05-16 NOTE — TELEPHONE ENCOUNTER
Message from Carmenhart:  Original authorizing provider: OMAR SWIFT NP, APRN CNP    Linda Nidhi would like a refill of the following medications:  LORazepam (ATIVAN) 0.5 MG tablet [OMAR SWIFT NP, APRN CNP]    Preferred pharmacy: [facility] [address] [facility]    Comment:

## 2018-05-17 RX ORDER — LORAZEPAM 0.5 MG/1
0.5 TABLET ORAL EVERY 8 HOURS PRN
Qty: 30 TABLET | Refills: 0 | OUTPATIENT
Start: 2018-05-17

## 2018-05-17 RX ORDER — LORAZEPAM 0.5 MG/1
0.5 TABLET ORAL EVERY 8 HOURS PRN
Qty: 30 TABLET | Refills: 0 | Status: SHIPPED | OUTPATIENT
Start: 2018-05-17 | End: 2018-07-26

## 2018-05-18 NOTE — TELEPHONE ENCOUNTER
Script walked over to the [facility].    Dayana Bowling, [facility]

## 2018-07-09 ENCOUNTER — MYC REFILL (OUTPATIENT)
Dept: FAMILY MEDICINE | Facility: CLINIC | Age: 53
End: 2018-07-09

## 2018-07-09 DIAGNOSIS — G44.209 TENSION HEADACHE: ICD-10-CM

## 2018-07-09 NOTE — TELEPHONE ENCOUNTER
Routing refill request to provider for review/approval because:  Drug not on the [facility] refill protocol   TRINI Torres

## 2018-07-09 NOTE — TELEPHONE ENCOUNTER
Message from Money Moverzechariaht:  Original authorizing provider: OMAR SWIFT NP, APRN CNP    Linda Terrell would like a refill of the following medications:  HYDROcodone-acetaminophen (NORCO) 5-325 MG per tablet [OMAR SWIFT NP, APRN CNP]    Preferred pharmacy: [facility] [address] [facility]    Comment:

## 2018-07-10 ENCOUNTER — MYC REFILL (OUTPATIENT)
Dept: FAMILY MEDICINE | Facility: CLINIC | Age: 53
End: 2018-07-10

## 2018-07-10 DIAGNOSIS — G44.209 TENSION HEADACHE: ICD-10-CM

## 2018-07-10 RX ORDER — HYDROCODONE BITARTRATE AND ACETAMINOPHEN 5; 325 MG/1; MG/1
TABLET ORAL
Qty: 20 TABLET | Refills: 0 | OUTPATIENT
Start: 2018-07-10

## 2018-07-10 RX ORDER — HYDROCODONE BITARTRATE AND ACETAMINOPHEN 5; 325 MG/1; MG/1
TABLET ORAL
Qty: 20 TABLET | Refills: 0 | Status: SHIPPED | OUTPATIENT
Start: 2018-07-10 | End: 2018-09-06

## 2018-07-10 NOTE — TELEPHONE ENCOUNTER
Routing refill request to provider for review/approval because:  Drug not on the [facility], [facility] or [facility] refill protocol or controlled substance  Cheryl Hernandez RN

## 2018-07-10 NOTE — TELEPHONE ENCOUNTER
Message from KIYATECzechariaht:  Original authorizing provider: OMAR SWIFT NP, APRN CNP    Linda Terrell would like a refill of the following medications:  HYDROcodone-acetaminophen (NORCO) 5-325 MG per tablet [OMAR SWIFT NP, APRN CNP]    Preferred pharmacy: [facility] [address] [facility]    Comment:

## 2018-07-11 NOTE — TELEPHONE ENCOUNTER
Rx walked to the [facility] pharmacy.     Maria D Thorpe, [place]

## 2018-07-23 DIAGNOSIS — F51.01 PRIMARY INSOMNIA: ICD-10-CM

## 2018-07-24 NOTE — TELEPHONE ENCOUNTER
"Requested Prescriptions   Pending Prescriptions Disp Refills     amitriptyline (ELAVIL) 75 MG tablet [Pharmacy Med Name: AMITRIPTYLINE 75MG TABLETS] 90 tablet 0    Last Written Prescription Date:  04/27/2018 #90 x 3  Last filled 06/26/2018  Last office visit: 8/11/2017 DU Suresh   Future Office Visit: None    Sig: TAKE 1 TABLET(75 MG) BY MOUTH AT BEDTIME    Tricyclic Agents ( Annual appt and no PHQ9) Passed    7/23/2018  1:21 PM       Passed - Blood Pressure under 140/90 in past 12 mos    BP Readings from Last 3 Encounters:   08/11/17 134/82   07/06/16 115/70   02/09/16 135/89                Passed - Recent (12 mo) or future (30 days) visit within authorizing provider's specialty    Patient had office visit in the last 12 months or has a visit in the next 30 days with authorizing provider or within the authorizing provider's specialty.  See \"Patient Info\" tab in inbasket, or \"Choose Columns\" in Meds & Orders section of the refill encounter.           Passed - Patient is age 18 or older       Passed - Patient is not pregnant       Passed - No positive pregnancy test on record in past 12 mos          "

## 2018-07-26 RX ORDER — AMITRIPTYLINE HYDROCHLORIDE 75 MG/1
75 TABLET ORAL
Qty: 90 TABLET | Refills: 0 | Status: SHIPPED | OUTPATIENT
Start: 2018-07-26 | End: 2018-08-24

## 2018-08-20 DIAGNOSIS — F43.21 ADJUSTMENT DISORDER WITH DEPRESSED MOOD: ICD-10-CM

## 2018-08-21 ENCOUNTER — MYC MEDICAL ADVICE (OUTPATIENT)
Dept: FAMILY MEDICINE | Facility: CLINIC | Age: 53
End: 2018-08-21

## 2018-08-21 DIAGNOSIS — F51.01 PRIMARY INSOMNIA: ICD-10-CM

## 2018-08-21 NOTE — TELEPHONE ENCOUNTER
"Requested Prescriptions   Pending Prescriptions Disp Refills     sertraline (ZOLOFT) 50 MG tablet [Pharmacy Med Name: SERTRALINE 50MG TABLETS] 90 tablet 0    Last Written Prescription Date:  08/30/2017 #90 x 1  Last filled 07/23/2018  Last office visit: 8/11/2017 DU Suresh   Future Office Visit:  None    Note: Medication not on the current med list.   Sig: TAKE 1 TABLET(50 MG) BY MOUTH DAILY    SSRIs Protocol Failed    8/20/2018  4:00 AM  PHQ-9 SCORE 12/2/2015 7/6/2016 8/11/2017   Total Score - - -   Total Score 6 5 3       ELISA-7 SCORE 7/1/2014 9/28/2015 8/11/2017   Total Score 0 - -   Total Score - 17 3              Failed - Recent (12 mo) or future (30 days) visit within the authorizing provider's specialty    Patient had office visit in the last 12 months or has a visit in the next 30 days with authorizing provider or within the authorizing provider's specialty.  See \"Patient Info\" tab in inbasket, or \"Choose Columns\" in Meds & Orders section of the refill encounter.           Passed - Patient is age 18 or older       Passed - No active pregnancy on record       Passed - No positive pregnancy test in last 12 months          "

## 2018-08-23 NOTE — TELEPHONE ENCOUNTER
I left a message for the patient to return my call OR check MyChart message sent 8/21/18.    Elvia BERRY RN

## 2018-08-24 RX ORDER — AMITRIPTYLINE HYDROCHLORIDE 75 MG/1
75 TABLET ORAL
Qty: 90 TABLET | Refills: 0 | Status: SHIPPED | OUTPATIENT
Start: 2018-08-24 | End: 2018-09-06

## 2018-08-24 NOTE — TELEPHONE ENCOUNTER
Patient calling back saying she needs a refill on the Amitriptyline, not the Sertraline, patient thinks this is automated refill requesting the Sertraline. Upon chart review the Sertraline was discontinued on 7-26-18, but patient says she is still taking it, Laquita Suresh discontinued it for reasons: therapy completed. Patient insists she is still on it. Have patient scheduled to have medication reviewed on 8-30-18 with PCP to review medications. Routing to Laquita to verify if patient is to take it or not, it is not active on the medication list.     TRINI Torres

## 2018-08-29 NOTE — TELEPHONE ENCOUNTER
Patient  Never returned call will close chart reopen if does  KAUSHIK Rizvi  RN/Nehemias Moralez

## 2018-09-06 ENCOUNTER — OFFICE VISIT (OUTPATIENT)
Dept: FAMILY MEDICINE | Facility: CLINIC | Age: 53
End: 2018-09-06
Payer: COMMERCIAL

## 2018-09-06 VITALS
BODY MASS INDEX: 23.29 KG/M2 | WEIGHT: 136.4 LBS | TEMPERATURE: 98.7 F | DIASTOLIC BLOOD PRESSURE: 88 MMHG | SYSTOLIC BLOOD PRESSURE: 138 MMHG | HEIGHT: 64 IN | HEART RATE: 68 BPM

## 2018-09-06 DIAGNOSIS — Z13.6 CARDIOVASCULAR SCREENING; LDL GOAL LESS THAN 160: ICD-10-CM

## 2018-09-06 DIAGNOSIS — Z11.59 NEED FOR HEPATITIS C SCREENING TEST: ICD-10-CM

## 2018-09-06 DIAGNOSIS — Z12.11 SPECIAL SCREENING FOR MALIGNANT NEOPLASMS, COLON: ICD-10-CM

## 2018-09-06 DIAGNOSIS — R73.09 ELEVATED GLUCOSE: ICD-10-CM

## 2018-09-06 DIAGNOSIS — Z12.31 ENCOUNTER FOR SCREENING MAMMOGRAM FOR MALIGNANT NEOPLASM OF BREAST: ICD-10-CM

## 2018-09-06 DIAGNOSIS — F51.01 PRIMARY INSOMNIA: ICD-10-CM

## 2018-09-06 DIAGNOSIS — G44.209 TENSION HEADACHE: Primary | ICD-10-CM

## 2018-09-06 PROBLEM — Z79.899 CONTROLLED SUBSTANCE AGREEMENT SIGNED: Status: ACTIVE | Noted: 2018-09-06

## 2018-09-06 PROCEDURE — 99214 OFFICE O/P EST MOD 30 MIN: CPT | Performed by: NURSE PRACTITIONER

## 2018-09-06 RX ORDER — AMITRIPTYLINE HYDROCHLORIDE 75 MG/1
75 TABLET ORAL
Qty: 90 TABLET | Refills: 1 | Status: SHIPPED | OUTPATIENT
Start: 2018-09-06 | End: 2019-07-21

## 2018-09-06 RX ORDER — HYDROCODONE BITARTRATE AND ACETAMINOPHEN 5; 325 MG/1; MG/1
TABLET ORAL
Qty: 20 TABLET | Refills: 0 | Status: SHIPPED | OUTPATIENT
Start: 2018-09-06

## 2018-09-06 RX ORDER — HYDROCODONE BITARTRATE AND ACETAMINOPHEN 5; 325 MG/1; MG/1
TABLET ORAL
Qty: 20 TABLET | Refills: 0 | Status: SHIPPED | OUTPATIENT
Start: 2018-11-01 | End: 2019-01-24

## 2018-09-06 ASSESSMENT — ANXIETY QUESTIONNAIRES
7. FEELING AFRAID AS IF SOMETHING AWFUL MIGHT HAPPEN: NOT AT ALL
5. BEING SO RESTLESS THAT IT IS HARD TO SIT STILL: NOT AT ALL
2. NOT BEING ABLE TO STOP OR CONTROL WORRYING: SEVERAL DAYS
3. WORRYING TOO MUCH ABOUT DIFFERENT THINGS: SEVERAL DAYS
1. FEELING NERVOUS, ANXIOUS, OR ON EDGE: SEVERAL DAYS
6. BECOMING EASILY ANNOYED OR IRRITABLE: NOT AT ALL
GAD7 TOTAL SCORE: 3

## 2018-09-06 ASSESSMENT — PAIN SCALES - GENERAL: PAINLEVEL: NO PAIN (0)

## 2018-09-06 ASSESSMENT — PATIENT HEALTH QUESTIONNAIRE - PHQ9: 5. POOR APPETITE OR OVEREATING: NOT AT ALL

## 2018-09-06 NOTE — LETTER
My Depression Action Plan  Name: Linda Terrell   Date of Birth 1965  Date: 9/6/2018    My doctor: Laquita Suresh   My clinic: [address] [address] [phone]          GREEN    ZONE   Good Control    What it looks like:     Things are going generally well. You have normal up s and down s. You may even feel depressed from time to time, but bad moods usually last less than a day.   What you need to do:  1. Continue to care for yourself (see self care plan)  2. Check your depression survival kit and update it as needed  3. Follow your physician s recommendations including any medication.  4. Do not stop taking medication unless you consult with your physician first.           YELLOW         ZONE Getting Worse    What it looks like:     Depression is starting to interfere with your life.     It may be hard to get out of bed; you may be starting to isolate yourself from others.    Symptoms of depression are starting to last most all day and this has happened for several days.     You may have suicidal thoughts but they are not constant.   What you need to do:     1. Call your care team, your response to treatment will improve if you keep your care team informed of your progress. Yellow periods are signs an adjustment may need to be made.     2. Continue your self-care, even if you have to fake it!    3. Talk to someone in your support network    4. Open up your depression survival kit           RED    ZONE Medical Alert - Get Help    What it looks like:     Depression is seriously interfering with your life.     You may experience these or other symptoms: You can t get out of bed most days, can t work or engage in other necessary activities, you have trouble taking care of basic hygiene, or basic responsibilities, thoughts of suicide or death that will not go away, self-injurious behavior.     What you need to do:  1. Call your care team  and request a same-day appointment. If they are not available (weekends or after hours) call your local crisis line, emergency room or 911.            Depression Self Care Plan / Survival Kit    Self-Care for Depression  Here s the deal. Your body and mind are really not as separate as most people think.  What you do and think affects how you feel and how you feel influences what you do and think. This means if you do things that people who feel good do, it will help you feel better.  Sometimes this is all it takes.  There is also a place for medication and therapy depending on how severe your depression is, so be sure to consult with your medical provider and/ or Behavioral Health Consultant if your symptoms are worsening or not improving.     In order to better manage my stress, I will:    Exercise  Get some form of exercise, every day. This will help reduce pain and release endorphins, the  feel good  chemicals in your brain. This is almost as good as taking antidepressants!  This is not the same as joining a gym and then never going! (they count on that by the way ) It can be as simple as just going for a walk or doing some gardening, anything that will get you moving.      Hygiene   Maintain good hygiene (Get out of bed in the morning, Make your bed, Brush your teeth, Take a shower, and Get dressed like you were going to work, even if you are unemployed).  If your clothes don't fit try to get ones that do.    Diet  I will strive to eat foods that are good for me, drink plenty of water, and avoid excessive sugar, caffeine, alcohol, and other mood-altering substances.  Some foods that are helpful in depression are: complex carbohydrates, B vitamins, flaxseed, fish or fish oil, fresh fruits and vegetables.    Psychotherapy  I agree to participate in Individual Therapy (if recommended).    Medication  If prescribed medications, I agree to take them.  Missing doses can result in serious side effects.  I understand  that drinking alcohol, or other illicit drug use, may cause potential side effects.  I will not stop my medication abruptly without first discussing it with my provider.    Staying Connected With Others  I will stay in touch with my friends, family members, and my primary care provider/team.    Use your imagination  Be creative.  We all have a creative side; it doesn t matter if it s oil painting, sand castles, or mud pies! This will also kick up the endorphins.    Witness Beauty  (AKA stop and smell the roses) Take a look outside, even in mid-winter. Notice colors, textures. Watch the squirrels and birds.     Service to others  Be of service to others.  There is always someone else in need.  By helping others we can  get out of ourselves  and remember the really important things.  This also provides opportunities for practicing all the other parts of the program.    Humor  Laugh and be silly!  Adjust your TV habits for less news and crime-drama and more comedy.    Control your stress  Try breathing deep, massage therapy, biofeedback, and meditation. Find time to relax each day.     My support system    Clinic Contact:  Phone number:    Contact 1:  Phone number:    Contact 2:  Phone number:    Zoroastrian/:  Phone number:    Therapist:  Phone number:    Local crisis center:    Phone number:    Other community support:  Phone number:

## 2018-09-06 NOTE — PATIENT INSTRUCTIONS
Please get your cancer screenings done! Please schedule a physical with pap smear, mammogram and colonoscopy or complete a FIT test.      You will need to make a lab only appointment,  [phone].   Or any of the [place] clinics.     Continue your medication     Exercise 30-60 min daily     Stay well hydrated - urine should be clear.     Write down your thoughts before you go to bed and tell yourself that you know where the information is, you can get it when you need it and you don't have to worry about it now.    Tell yourself that you are going to fall a sleep,  Sleep all night and wake rested in the morning,  Tell this to yourself at least 8 times before going to bed.  Do your deep breathing  Breath in to the count of 3, hold your breath to the count of 3 and out to the count of 3.  Repeat this 2-3 times.    If you wake during the night repeat this.      For the anxiety   -- figure out the  Triggers for your anxiety and then figure out coping mechanisms

## 2018-09-06 NOTE — LETTER
[facility]  [address] [address]  [phone]        September 13, 2019    Linda Terrell  [address] [place] [id number]              Dear Linda Terrell    This is to remind you that your fasting view is due.    You may call our office at [phone] to schedule an appointment.    Please disregard this notice if you have already had your labs drawn or made an appointment.        Sincerely,        Laquita Suresh RN, CNP

## 2018-09-06 NOTE — PROGRESS NOTES
SUBJECTIVE:   Linda Terrell is a 53 year old female who presents to clinic today for the following health issues:      Medication Followup of Norco, Amitriptyline    Taking Medication as prescribed: yes    Side Effects:  None    Medication Helping Symptoms:  yes     Has been helping her daughter dealing with  Thyroid cancer     Problem list and histories reviewed & adjusted, as indicated.  Additional history: as documented    Patient Active Problem List   Diagnosis     INTERMITTENTLY ELEVATED BP WITH SWEATS     Panic disorder without agoraphobia     Uterovaginal prolapse, incomplete     CARDIOVASCULAR SCREENING; LDL GOAL LESS THAN 160     24 hour contact handout given     Tension headache     Insomnia     SI (sacroiliac) joint dysfunction     Elevated glucose     Major depressive disorder, recurrent episode, mild (H)     Adjustment disorder with depressed mood     Panic attack as reaction to stress     Chronic pain     Past Surgical History:   Procedure Laterality Date     BUNIONECTOMY RT/LT      left     C TMJ UNSPECIFIED THERAPY      surgery on jaw     TUBAL LIGATION         Social History   Substance Use Topics     Smoking status: Never Smoker     Smokeless tobacco: Never Used     Alcohol use Yes      Comment: occasional     Family History   Problem Relation Age of Onset     Breast Cancer Mother      Hypertension Father      Cerebrovascular Disease Paternal Grandfather      Respiratory Paternal Grandmother      Emphysema     C.A.D. No family hx of      Diabetes No family hx of      Cancer - colorectal No family hx of      Prostate Cancer No family hx of          Current Outpatient Prescriptions   Medication Sig Dispense Refill     amitriptyline (ELAVIL) 75 MG tablet Take 1 tablet (75 mg) by mouth nightly as needed for sleep Last refill until seen 90 tablet 0     HYDROcodone-acetaminophen (NORCO) 5-325 MG per tablet Take 1 tablet at onset of   severe tension headache,  Taking no more than 2 per headache  Try to  "make them last  For 6-8 weeks   LAST refill over due to be seen 20 tablet 0     Allergies   Allergen Reactions     Codeine Itching     Can take vicodin.      Imitrex [Sumatriptan] Other (See Comments)     Makes her headaches worse.      BP Readings from Last 3 Encounters:   09/06/18 138/88   08/11/17 134/82   07/06/16 115/70    Wt Readings from Last 3 Encounters:   09/06/18 136 lb 6.4 oz (61.9 kg)   08/11/17 140 lb 9.6 oz (63.8 kg)   07/06/16 145 lb (65.8 kg)                    Reviewed and updated as needed this visit by clinical staff       Reviewed and updated as needed this visit by Provider         ROS:  CONSTITUTIONAL: NEGATIVE for fever, chills, change in weight  EYES: POSITIVE for corrected vision and will get some headaches from the  florescent lights.   ENT/MOUTH: NEGATIVE for ear, mouth and throat problems  RESP: NEGATIVE for significant cough or SOB  CV: NEGATIVE for chest pain, palpitations or peripheral edema  NEURO: POSITIVE for headache with the florescent light    ENDOCRINE: NEGATIVE for temperature intolerance, skin/hair changes  PSYCHIATRIC: POSITIVE for anxiety and stress related daughter's cancer, ( thyroid ) and daughters divorce     OBJECTIVE:     /88 (BP Location: Left arm, Patient Position: Chair, Cuff Size: Adult Regular)  Pulse 68  Temp 98.7  F (37.1  C) (Tympanic)  Ht 5' 3.5\" (1.613 m)  Wt 136 lb 6.4 oz (61.9 kg)  BMI 23.78 kg/m2  Body mass index is 23.78 kg/(m^2).   GENERAL: healthy, alert and no distress  HENT: ear canals and TM's normal, nose and mouth without ulcers or lesions  NECK: no adenopathy, no asymmetry, masses, or scars and thyroid normal to palpation  RESP: lungs clear to auscultation - no rales, rhonchi or wheezes  CV: regular rate and rhythm, normal S1 S2, no S3 or S4, no murmur, click or rub, no peripheral edema and peripheral pulses strong  ABDOMEN: soft, nontender, no hepatosplenomegaly, no masses and bowel sounds normal  MS: no gross musculoskeletal defects " noted, no edema  NEURO: Normal strength and tone, sensory exam grossly normal and mentation intact  PSYCH: mentation appears normal, affect normal/bright, she is not exhibiting an paranoid behaviors, talk,  She is refusing to continue to take Haldol.  Will not talk to consider continuing to take the medication     Diagnostic Test Results:  Labs are future     ASSESSMENT/PLAN:     \  ASSESSMENT/PLAN:    ASSESSMENT/PLAN:      ICD-10-CM    1. Tension headache G44.209 HYDROcodone-acetaminophen (NORCO) 5-325 MG per tablet     HYDROcodone-acetaminophen (NORCO) 5-325 MG per tablet   2. Primary insomnia F51.01 amitriptyline (ELAVIL) 75 MG tablet   3. Encounter for screening mammogram for malignant neoplasm of breast Z12.31 MA Screening Digital Bilateral   4. CARDIOVASCULAR SCREENING; LDL GOAL LESS THAN 160 Z13.6 Lipid panel reflex to direct LDL Fasting     **Comprehensive metabolic panel FUTURE 1yr     CANCELED: Lipid panel reflex to direct LDL Fasting   5. Need for hepatitis C screening test Z11.59 **Hepatitis C Screen Reflex to RNA FUTURE anytime   6. Special screening for malignant neoplasms, colon Z12.11 Fecal colorectal cancer screen (FIT)     CANCELED: GASTROENTEROLOGY ADULT REF PROCEDURE ONLY [facility] [phone]   7. Elevated glucose R73.09 **Comprehensive metabolic panel FUTURE 1yr       Patient Instructions   Please get your cancer screenings done! Please schedule a physical with pap smear, mammogram and colonoscopy or complete a FIT test.      You will need to make a lab only appointment,  [phone].   Or any of the [place] clinics.     Continue your medication     Exercise 30-60 min daily     Stay well hydrated - urine should be clear.     Write down your thoughts before you go to bed and tell yourself that you know where the information is, you can get it when you need it and you don't have to worry about it now.    Tell yourself that you are going to fall a sleep,  Sleep all night and wake rested  in the morning,  Tell this to yourself at least 8 times before going to bed.  Do your deep breathing  Breath in to the count of 3, hold your breath to the count of 3 and out to the count of 3.  Repeat this 2-3 times.    If you wake during the night repeat this.      For the anxiety   -- figure out the  Triggers for your anxiety and then figure out coping mechanisms                     See Patient Instructions    OMAR SWIFT NP, APRN [facility]

## 2018-09-06 NOTE — LETTER
[facility]    09/06/18    Patient: Linda Terrell  YOB: 1965  Medical Record Number: [id number]                                                                  Controlled Substance Agreement  I understand that my care provider has prescribed controlled substances (narcotics, tranquilizers, and/or stimulants) to help manage my condition(s).  I am taking this medicine to help me function or work.  I know that this is strong medicine.  It could have serious side effects and even cause a dependency on the drug.  If I stop these medicines suddenly, I could have severe withdrawal symptoms.    The risks, benefits, and side effects of these medication(s) were explained to me.  I agree that:  1. I will take part in other treatments as advised by my provider.  This may be psychiatry or counseling, physical therapy, behavioral therapy, group treatment, or a referral to a pain clinic.  I will reduce or stop my medicine when my provider tells me to do so.   2. I will take my medicines as prescribed.  I will not change the dose or schedule unless my provider tells me to.  There will be no refills if I  run out early.   I may be contacted at any time without warning and asked to complete a drug test or pill count.   3. I will keep all my appointments at the clinic.  If I miss appointments or fail to follow instructions, my provider may stop my medicine.  4. I will not ask other providers to prescribe controlled substances. And I will not accept controlled substances from other people. If I need another prescribed controlled substance for a new reason, I will notify my provider within one business day.  5. If I enroll in the [place] Medical Marijuana program, I will tell my provider.  I will also sign an agreement to share my medical records with my provider.  6. I will use one pharmacy to fill all of my controlled substance prescriptions.  If my prescription is mailed to my pharmacy, it may take 5  to 7 days for my medicine to be ready.  7. I understand that my provider, clinic care team, and pharmacy can track controlled substance prescriptions from other providers through a central database (prescription monitoring program).  8. I will bring in my list of medications (or my medicine bottles) each time I come to the clinic.  [id number] REV-  07/2018                                                                                                                                   Page 1 of 2      [facility]    09/06/18    Patient: Linda Terrell  YOB: 1965  Medical Record Number: [id number]    9. Refills of controlled substances will be made only during office hours.  It is up to me to make sure that I do not run out of my medicines on weekends or holidays.    10. I am responsible for my prescriptions.  If the medicine/prescription is lost or stolen, it will not be replaced.   I also agree not to share these medicines with anyone.  11. I agree to not use ANY illegal or recreational drugs.  This includes marijuana, cocaine, bath salts or other drugs.  I agree not to use alcohol unless my provider says I may.  I agree to give urine samples whenever asked.  If I fail to give a urine sample, the provider may stop my medicine.     12. I will tell my nurse or provider right away if I become pregnant or have a new medical problem treated outside of [facility].  13. I understand that this medicine can affect my thinking and judgment.  It may be unsafe for me to drive, use machinery and do dangerous tasks.  I will not do any of these things until I know how the medicine affects me.  If my dose changes, I will wait to see how it affects me.  I will contact my provider if I have concerns about medicine side effects.  I understand that if I do not follow any of the conditions above, my prescriptions or treatment may be stopped.    I agree that my provider, clinic care team, and pharmacy may  work with any city, state or federal law enforcement agency that investigates the misuse, sale, or other diversion of my controlled medicine. I will allow my provider to discuss my care with or share a copy of this agreement with any other treating provider, pharmacy or emergency room where I receive care.  I agree to give up (waive) any right of privacy or confidentiality with respect to these authorizations.   I have read this agreement and have asked questions about anything I did not understand.   ___________________________________    ___________________________  Patient Signature                                                           Date and Time  ___________________________________     ____________________________  Witness                                                                            Date and Time  ___________________________________  OMAR SWIFT NP, APRN CNP  [id number] REV-  07/2018                                                                                                                                                   Page 2 of 2

## 2018-09-07 ASSESSMENT — ANXIETY QUESTIONNAIRES: GAD7 TOTAL SCORE: 3

## 2018-09-07 ASSESSMENT — PATIENT HEALTH QUESTIONNAIRE - PHQ9: SUM OF ALL RESPONSES TO PHQ QUESTIONS 1-9: 2

## 2018-10-12 DIAGNOSIS — F43.21 ADJUSTMENT DISORDER WITH DEPRESSED MOOD: ICD-10-CM

## 2018-10-15 NOTE — TELEPHONE ENCOUNTER
"Requested Prescriptions   Pending Prescriptions Disp Refills     sertraline (ZOLOFT) 50 MG tablet [Pharmacy Med Name: SERTRALINE 50MG TABLETS] 90 tablet 0    Last Written Prescription Date:  08/30/2017 #90 x 1  Last filled 07/23/2018  Last office visit: 9/6/2018 DU Suresh   Future Office Visit:  None    Note: Medication is not on the current med list.   Sig: TAKE 1 TABLET(50 MG) BY MOUTH DAILY    SSRIs Protocol Passed    10/12/2018  4:02 PM  PHQ-9 SCORE 7/6/2016 8/11/2017 9/6/2018   Total Score - - -   Total Score 5 3 2       ELISA-7 SCORE 9/28/2015 8/11/2017 9/6/2018   Total Score - - -   Total Score 17 3 3              Passed - Recent (12 mo) or future (30 days) visit within the authorizing provider's specialty    Patient had office visit in the last 12 months or has a visit in the next 30 days with authorizing provider or within the authorizing provider's specialty.  See \"Patient Info\" tab in inbasket, or \"Choose Columns\" in Meds & Orders section of the refill encounter.           Passed - Patient is age 18 or older       Passed - No active pregnancy on record       Passed - No positive pregnancy test in last 12 months          "

## 2018-10-15 NOTE — TELEPHONE ENCOUNTER
Routing refill request to provider for review/approval because:  Drug not active on patient's medication list    Yvonne Villa RN

## 2018-11-02 ENCOUNTER — TELEPHONE (OUTPATIENT)
Dept: FAMILY MEDICINE | Facility: CLINIC | Age: 53
End: 2018-11-02

## 2018-11-02 NOTE — TELEPHONE ENCOUNTER
Panel Management Review      Patient has the following on her problem list:     Depression / Dysthymia review    Measure:  Needs PHQ-9 score of 4 or less during index window.  Administer PHQ-9 and if score is 5 or more, send encounter to provider for next steps.    5 - 7 month window range: none    PHQ-9 SCORE 7/6/2016 8/11/2017 9/6/2018   Total Score - - -   Total Score 5 3 2       If PHQ-9 recheck is 5 or more, route to provider for next steps.    Patient is due for:  None      Composite cancer screening  Chart review shows that this patient is due/due soon for the following Pap Smear, Mammogram and Colonoscopy  Summary:    Patient is due/failing the following:   Pap, lipid, colon screen, mammo    Action needed:   Patient needs office visit for physical/pap w/ fasting labs.  Referral for mammo and colon screen    Type of outreach:    none - patient was reminded at appt 9/2018     Questions for provider review:    None                                                                                                                                    Maryellen MCCAULEY       Chart routed to none .

## 2018-11-09 DIAGNOSIS — F41.0 PANIC DISORDER WITHOUT AGORAPHOBIA: ICD-10-CM

## 2018-11-09 RX ORDER — LORAZEPAM 0.5 MG/1
0.5 TABLET ORAL EVERY 8 HOURS PRN
Qty: 30 TABLET | Refills: 0 | Status: SHIPPED | OUTPATIENT
Start: 2018-11-09 | End: 2019-03-19

## 2018-11-09 NOTE — TELEPHONE ENCOUNTER
Patient requesting a refill on lorazepam and LM for her to call us back with pharmacy she wants it faxed to.  Pharmacy selected.  Patient says she needs it today please.    Dayana Bowling, [facility]

## 2018-11-10 ENCOUNTER — NURSE TRIAGE (OUTPATIENT)
Dept: NURSING | Facility: CLINIC | Age: 53
End: 2018-11-10

## 2018-11-10 NOTE — TELEPHONE ENCOUNTER
"Pt call for a refill and when I called her back I left a generic message to call us again if still has questions.     Kayleen Medina RN, [facility]    Reason for Disposition    Caller has URGENT medication question about med that PCP prescribed and triager unable to answer question    Additional Information    Negative: Drug overdose and nurse unable to answer question    Negative: Caller requesting information not related to medicine    Negative: Caller requesting a prescription for Strep throat and has a positive culture result    Negative: Rash while taking a medication or within 3 days of stopping it    Negative: Immunization reaction suspected    Negative: [1] Asthma and [2] having symptoms of asthma (cough, wheezing, etc)    Negative: MORE THAN A DOUBLE DOSE of a prescription or over-the-counter (OTC) drug    Negative: [1] DOUBLE DOSE (an extra dose or lesser amount) of over-the-counter (OTC) drug AND [2] any symptoms (e.g., dizziness, nausea, pain, sleepiness)    Negative: [1] DOUBLE DOSE (an extra dose or lesser amount) of prescription drug AND [2] any symptoms (e.g., dizziness, nausea, pain, sleepiness)    Negative: Took another person's prescription drug    Negative: [1] DOUBLE DOSE (an extra dose or lesser amount) of prescription drug AND [2] NO symptoms (Exception: a double dose of antibiotics)    Negative: Diabetes drug error or overdose (e.g., insulin or extra dose)    Negative: [1] Request for URGENT new prescription or refill of \"essential\" medication (i.e., likelihood of harm to patient if not taken) AND [2] triager unable to fill per unit policy    Negative: [1] Prescription not at pharmacy AND [2] was prescribed today by PCP    Negative: Pharmacy calling with prescription questions and triager unable to answer question    Protocols used: MEDICATION QUESTION CALL-ADULT-    "

## 2018-11-10 NOTE — TELEPHONE ENCOUNTER
Caller states and pharmacy confirms that  The following  RX was not received by FAX on 11/09/18  No documention  by clinic staff confirming receipt of FAX by pharmacy   Pharmacist Emilia @ [facility] accepts  Telephone order given by KIRBY ORR     Order Information   Ordered Status Priority Ordering User Department   11/09/18 Sent (none) Laquita Suresh APRN CNP  F[facility]ILY [facility]ACTICE   Order History   Outpatient   Date/Time Action Taken User Additional Information   11/09/18 1424 Pend SharonryanDayanaElma MOHIT    11/09/18 1641 Sign Laquita Suresh APRN CNP Reorder from Order: [id number]   11/09/18 1641 Taking Flag Checked Laquita Suresh APRN CNP    Medication Detail      Disp Refills Start End YUN   LORazepam (ATIVAN) 0.5 MG tablet 30 tablet 0 11/9/2018  No   Sig - Route: Take 1 tablet (0.5 mg) by mouth every 8 hours as needed for anxiety Use sparingly  t - Oral   Class: Local Print   Order: [id number]   Printout Tracking   External Result Report   Medication Administration Instructions   Use sparingly  t   Pharmacy   [facility] [address] [place] [address] - [address] [place] [address] AVE [address] AT SEC OF [facility]   Associated Diagnoses   Panic disorder without agoraphobia [F41.0]           Reason for Disposition    [1] Prescription not at pharmacy AND [2] was prescribed today by PCP    Protocols used: MEDICATION QUESTION CALL-ADULT-

## 2018-11-10 NOTE — TELEPHONE ENCOUNTER
Regarding: prescription fill  ----- Message from Yoselin Colbert sent at 11/10/2018 11:13 AM CST -----  Reason for call:  Other   Patient called regarding (reason for call): prescription  Additional comments: there is an authorized prescription but it has not been filled    Phone number to reach patient: [phone]    Best Time:  anytime    Can we leave a detailed message on this number?  YES

## 2018-11-12 ENCOUNTER — TELEPHONE (OUTPATIENT)
Dept: FAMILY MEDICINE | Facility: CLINIC | Age: 53
End: 2018-11-12

## 2018-11-12 NOTE — TELEPHONE ENCOUNTER
rx for ativan was faxed, to the pts pharmacy.      (it is a med that can be faxed).       Maria D Thorpe, [place]

## 2018-11-12 NOTE — TELEPHONE ENCOUNTER
This medication ( Ativan) needs to have the hard copy picked up by the pt.   There is a note that I can not respond to stating her pharmacy didn't get the faxed prescription for Ativan .    Please let her know she needs to  the hard copy  Done 11/9/18   Laquita BURGOS-CNP

## 2018-12-19 ENCOUNTER — VIRTUAL VISIT (OUTPATIENT)
Dept: FAMILY MEDICINE | Facility: OTHER | Age: 53
End: 2018-12-19

## 2018-12-19 NOTE — PROGRESS NOTES
"Date:   Clinician: Afshan Dunham  Clinician NPI: [id number]  Patient: Linda Terrell  Patient : 1965  Patient Address: [address] [place], [place] [address] [address]  Patient Phone: [phone]  Visit Protocol: URI  Patient Summary:  Linda is a 53 year old ( : 1965 ) female who initiated a Visit for cold, sinus infection, or influenza. When asked the question \"Please sign me up to receive news, health information and promotions from [employer].\", Linda responded \"No\".    Linda states her symptoms started suddenly 3-6 days ago.   Her symptoms consist of wheezing, myalgia, a headache, a sore throat, malaise, facial pain or pressure, a cough, chills, and nasal congestion. Linda also feels feverish.   Symptom details     Nasal secretions: The color of her mucus is yellow.    Cough: Linda coughs a few times an hour and her cough is more bothersome at night. Phlegm comes into her throat when she coughs. She does not believe the phlegm causes the cough. The color of the phlegm is yellow.     Sore throat: Linda reports having mild throat pain (1-3 on a 10 point pain scale), does not have exudate on her tonsils, and can swallow liquids. The lymph nodes in her neck are not enlarged. A rash has not appeared on the skin since the sore throat started.     Temperature: Her current temperature is 99.1 degrees Fahrenheit.     Wheezing: Linda has not ever been diagnosed with asthma. The wheezing does not interfere with her normal daily activities.    Facial pain or pressure: The facial pain or pressure feels worse when bending over or leaning forward.     Headache: She states the headache is moderate (4-6 on a 10 point pain scale).      Linda denies having teeth pain, enlarged lymph nodes, ear pain, and rhinitis. She also denies taking antibiotic medication for the symptoms, having recent facial or sinus surgery in the past 60 days, and double sickening (worsening symptoms after initial improvement). She is " not experiencing dyspnea.   Within the past week, Linda has not been exposed to someone with strep throat. She has not recently been exposed to someone with influenza. Linda has not been in close contact with any high risk individuals.   Linda had 2 sinus infections within the past year.   Weight: 137 lbs   Linda does not smoke or use smokeless tobacco.   Additional information as reported by the patient (free text): Body aches,neck ache,headache/sinus pressure, runned down,fatigue, cold/flu symptoms. ..No nausea No diarrhea ..My daughter was recently diagnosed with pneumonia whom I've recently seen , also have been around others with my symptoms.    MEDICATIONS: amitriptyline oral, Zoloft oral, ALLERGIES: Tylenol-Codeine #3  Clinician Response:  Dear Linda,  I am sorry you are not feeling well. To determine the most appropriate care for you, I would like you to be seen in person to further discuss your health history and symptoms.  You will not be charged for this Visit. Thank you for trusting us with your care.   Diagnosis: Refer for additional evaluation  Diagnosis ICD: R69  Diagnosis ICD: 462.0

## 2019-01-18 DIAGNOSIS — F43.21 ADJUSTMENT DISORDER WITH DEPRESSED MOOD: ICD-10-CM

## 2019-01-18 NOTE — TELEPHONE ENCOUNTER
"Requested Prescriptions   Pending Prescriptions Disp Refills     sertraline (ZOLOFT) 50 MG tablet [Pharmacy Med Name: SERTRALINE 50MG TABLETS] 90 tablet 0    Last Written Prescription Date:  10/15/2018 #90 x 0  Last filled 12/16/2018  Last office visit: 9/6/2018 DU Suresh   Future Office Visit: None    Sig: TAKE 1 TABLET(50 MG) BY MOUTH DAILY    SSRIs Protocol Passed - 1/18/2019  4:00 AM       Passed - PHQ-9 score less than 5 in past 6 months    Please review last PHQ-9 score.   PHQ-9 SCORE 7/6/2016 8/11/2017 9/6/2018   PHQ-9 Total Score - - -   PHQ-9 Total Score 5 3 2       ELISA-7 SCORE 9/28/2015 8/11/2017 9/6/2018   Total Score - - -   Total Score 17 3 3                Passed - Medication is active on med list       Passed - Patient is age 18 or older       Passed - No active pregnancy on record       Passed - No positive pregnancy test in last 12 months       Passed - Recent (6 mo) or future (30 days) visit within the authorizing provider's specialty    Patient had office visit in the last 6 months or has a visit in the next 30 days with authorizing provider or within the authorizing provider's specialty.  See \"Patient Info\" tab in inbasket, or \"Choose Columns\" in Meds & Orders section of the refill encounter.              "

## 2019-01-22 ENCOUNTER — MYC REFILL (OUTPATIENT)
Dept: FAMILY MEDICINE | Facility: CLINIC | Age: 54
End: 2019-01-22

## 2019-01-22 DIAGNOSIS — G44.209 TENSION HEADACHE: ICD-10-CM

## 2019-01-22 NOTE — TELEPHONE ENCOUNTER
Routing refill request to provider for review/approval because:  Drug not on the [facility] refill protocol   Jacque Duran RN

## 2019-01-23 ENCOUNTER — MYC REFILL (OUTPATIENT)
Dept: FAMILY MEDICINE | Facility: CLINIC | Age: 54
End: 2019-01-23

## 2019-01-23 DIAGNOSIS — G44.209 TENSION HEADACHE: ICD-10-CM

## 2019-01-24 ENCOUNTER — OFFICE VISIT (OUTPATIENT)
Dept: FAMILY MEDICINE | Facility: CLINIC | Age: 54
End: 2019-01-24
Payer: COMMERCIAL

## 2019-01-24 VITALS
SYSTOLIC BLOOD PRESSURE: 138 MMHG | RESPIRATION RATE: 16 BRPM | OXYGEN SATURATION: 100 % | TEMPERATURE: 98.1 F | HEART RATE: 108 BPM | DIASTOLIC BLOOD PRESSURE: 86 MMHG | WEIGHT: 142.8 LBS | BODY MASS INDEX: 24.9 KG/M2

## 2019-01-24 DIAGNOSIS — G44.209 TENSION HEADACHE: ICD-10-CM

## 2019-01-24 DIAGNOSIS — J01.10 ACUTE NON-RECURRENT FRONTAL SINUSITIS: ICD-10-CM

## 2019-01-24 DIAGNOSIS — H65.93 OME (OTITIS MEDIA WITH EFFUSION), BILATERAL: Primary | ICD-10-CM

## 2019-01-24 PROCEDURE — 99213 OFFICE O/P EST LOW 20 MIN: CPT | Performed by: NURSE PRACTITIONER

## 2019-01-24 RX ORDER — HYDROCODONE BITARTRATE AND ACETAMINOPHEN 5; 325 MG/1; MG/1
TABLET ORAL
Qty: 20 TABLET | Refills: 0 | Status: SHIPPED | OUTPATIENT
Start: 2019-03-21 | End: 2019-05-30

## 2019-01-24 RX ORDER — AMOXICILLIN 500 MG/1
500 CAPSULE ORAL 2 TIMES DAILY
Qty: 20 CAPSULE | Refills: 0 | Status: SHIPPED | OUTPATIENT
Start: 2019-01-24 | End: 2019-02-03

## 2019-01-24 RX ORDER — HYDROCODONE BITARTRATE AND ACETAMINOPHEN 5; 325 MG/1; MG/1
TABLET ORAL
Qty: 20 TABLET | Refills: 0 | Status: SHIPPED | OUTPATIENT
Start: 2019-01-24

## 2019-01-24 RX ORDER — HYDROCODONE BITARTRATE AND ACETAMINOPHEN 5; 325 MG/1; MG/1
TABLET ORAL
Qty: 20 TABLET | Refills: 0 | Status: SHIPPED | OUTPATIENT
Start: 2019-02-21

## 2019-01-24 NOTE — PATIENT INSTRUCTIONS
You do have a sinus infection  And your ears have fluid behind the ear drums.   Stay well hydrated - urine should be clear.   Vitamin C  1000 mg  2-3 times per day     You can try taking  Airborne or EmergenC. Daily      Start the Amoxicillin when get it and then take the second dose this evening ,  Tomorrow you can take it  2 capsules three times during the day/evening   Then drop to  2 capsules twice per day .

## 2019-01-24 NOTE — PROGRESS NOTES
SUBJECTIVE:   Linda Terrell is a 53 year old female who presents to clinic today for the following health issues:    ENT Symptoms             Symptoms: cc Present Absent Comment   Fever/Chills  x  Chills/sweats, has not checked temp   Fatigue  x  More tired than usual   Muscle Aches  x  Generalized aches   Eye Irritation   x    Sneezing   x    Nasal Ambrocio/Drg  x  Nose is very stuffy    Sinus Pressure/Pain  x  Sinus pressure/pain, frontal headaches   Loss of smell  x  Some loss of smell   Dental pain   x    Sore Throat   x    Swollen Glands   x    Ear Pain/Fullness  x  Balance feels off, pressure felt in her ears   Cough   x    Wheeze   x    Chest Pain   x    Shortness of breath   x    Rash   x    Other   x      Symptom duration:  1 week   Symptom severity:  Waxing and waning   Treatments tried:  Tylenol cold and sinus, Vitamin  D and a multivitamin    Contacts:  Works in [employer], unknown         This is the 3rd round of URI in the past month , yesterday she was very run down     Problem list and histories reviewed & adjusted, as indicated.  Additional history: as documented    Patient Active Problem List   Diagnosis     Panic disorder without agoraphobia     Uterovaginal prolapse, incomplete     CARDIOVASCULAR SCREENING; LDL GOAL LESS THAN 160     24 hour contact handout given     Tension headache     Insomnia     SI (sacroiliac) joint dysfunction     Elevated glucose     Adjustment disorder with depressed mood     Panic attack as reaction to stress     Chronic pain     Controlled substance agreement signed     Past Surgical History:   Procedure Laterality Date     BUNIONECTOMY RT/LT      left     C TMJ UNSPECIFIED THERAPY      surgery on jaw     TUBAL LIGATION         Social History     Tobacco Use     Smoking status: Never Smoker     Smokeless tobacco: Never Used   Substance Use Topics     Alcohol use: Yes     Comment: occasional     Family History   Problem Relation Age of Onset     Breast Cancer Mother       Hypertension Father      Cerebrovascular Disease Paternal Grandfather      Respiratory Paternal Grandmother         Emphysema     C.A.D. No family hx of      Diabetes No family hx of      Cancer - colorectal No family hx of      Prostate Cancer No family hx of          Current Outpatient Medications   Medication Sig Dispense Refill     amitriptyline (ELAVIL) 75 MG tablet Take 1 tablet (75 mg) by mouth nightly as needed for sleep 90 tablet 1     HYDROcodone-acetaminophen (NORCO) 5-325 MG per tablet Take 1 tablet at onset of   severe tension headache,  Taking no more than 2 per headache  Try to make them last  For 6-8 weeks   LAST refill over due to be seen 20 tablet 0     HYDROcodone-acetaminophen (NORCO) 5-325 MG per tablet Take 1 tablet at onset of   severe tension headache,  Taking no more than 2 per headache  Try to make them last  For 6-8 weeks 20 tablet 0     LORazepam (ATIVAN) 0.5 MG tablet Take 1 tablet (0.5 mg) by mouth every 8 hours as needed for anxiety Use sparingly  t 30 tablet 0     sertraline (ZOLOFT) 50 MG tablet TAKE 1 TABLET(50 MG) BY MOUTH DAILY 90 tablet 0     Allergies   Allergen Reactions     Codeine Itching     Can take vicodin.      Imitrex [Sumatriptan] Other (See Comments)     Makes her headaches worse.      BP Readings from Last 3 Encounters:   01/24/19 138/86   09/06/18 138/88   08/11/17 134/82    Wt Readings from Last 3 Encounters:   01/24/19 64.8 kg (142 lb 12.8 oz)   09/06/18 61.9 kg (136 lb 6.4 oz)   08/11/17 63.8 kg (140 lb 9.6 oz)                    Reviewed and updated as needed this visit by clinical staff       Reviewed and updated as needed this visit by Provider         ROS:  See notes above.      OBJECTIVE:     /86 (BP Location: Left arm, Patient Position: Chair, Cuff Size: Adult Regular)   Pulse 108   Temp 98.1  F (36.7  C) (Tympanic)   Resp 16   Wt 64.8 kg (142 lb 12.8 oz)   SpO2 100%   BMI 24.90 kg/m    Body mass index is 24.9 kg/m .   GENERAL: alert, no distress  and fatigued  HENT: right ear: clear effusion, left ear: clear effusion, nasal mucosa edematous , rhinorrhea yellow, green and thick, oropharynx clear, oral mucous membranes moist and sinuses: maxillary, frontal tenderness on left, maxillary, frontal swelling on left  NECK: no adenopathy, no asymmetry, masses, or scars and thyroid normal to palpation  RESP: lungs clear to auscultation - no rales, rhonchi or wheezes  CV: regular rate and rhythm, normal S1 S2, no S3 or S4, no murmur, click or rub, no peripheral edema and peripheral pulses strong  MS: no gross musculoskeletal defects noted, no edema    Diagnostic Test Results:  none     ASSESSMENT/PLAN:     ASSESSMENT/PLAN:      ICD-10-CM    1. OME (otitis media with effusion), bilateral H65.93 amoxicillin (AMOXIL) 500 MG capsule   2. Acute non-recurrent frontal sinusitis J01.10 amoxicillin (AMOXIL) 500 MG capsule       Patient Instructions   You do have a sinus infection  And your ears have fluid behind the ear drums.   Stay well hydrated - urine should be clear.   Vitamin C  1000 mg  2-3 times per day     You can try taking  Airborne or EmergenC. Daily      Start the Amoxicillin when get it and then take the second dose this evening ,  Tomorrow you can take it  2 capsules three times during the day/evening   Then drop to  2 capsules twice per day .                      MEDICATIONS:        - Start taking Amoxicillin        - Continue other medications without change  See Patient Instructions    OMAR SWIFT NP, APRN CNP  [facility]

## 2019-01-24 NOTE — NURSING NOTE
"Initial /86 (BP Location: Left arm, Patient Position: Chair, Cuff Size: Adult Regular)   Pulse 108   Temp 98.1  F (36.7  C) (Tympanic)   Resp 16   Wt 64.8 kg (142 lb 12.8 oz)   SpO2 100%   BMI 24.90 kg/m   Estimated body mass index is 24.9 kg/m  as calculated from the following:    Height as of 9/6/18: 1.613 m (5' 3.5\").    Weight as of this encounter: 64.8 kg (142 lb 12.8 oz). .    Margi Lassiter CMA (AAMA)    "

## 2019-01-25 NOTE — TELEPHONE ENCOUNTER
Script walked over to the [facility].    Dayana Bowling, [facility]

## 2019-03-18 ENCOUNTER — MYC REFILL (OUTPATIENT)
Dept: FAMILY MEDICINE | Facility: CLINIC | Age: 54
End: 2019-03-18

## 2019-03-18 DIAGNOSIS — F41.0 PANIC DISORDER WITHOUT AGORAPHOBIA: ICD-10-CM

## 2019-03-18 NOTE — TELEPHONE ENCOUNTER
Prescription approved per [facility] Refill Protocol.  Jacque Duran RN

## 2019-03-19 ENCOUNTER — MYC REFILL (OUTPATIENT)
Dept: FAMILY MEDICINE | Facility: CLINIC | Age: 54
End: 2019-03-19

## 2019-03-19 DIAGNOSIS — F41.0 PANIC DISORDER WITHOUT AGORAPHOBIA: ICD-10-CM

## 2019-03-20 RX ORDER — LORAZEPAM 0.5 MG/1
0.5 TABLET ORAL EVERY 8 HOURS PRN
Qty: 30 TABLET | Refills: 0 | Status: SHIPPED | OUTPATIENT
Start: 2019-03-20

## 2019-03-22 RX ORDER — LORAZEPAM 0.5 MG/1
0.5 TABLET ORAL EVERY 8 HOURS PRN
Qty: 30 TABLET | Refills: 0 | Status: SHIPPED | OUTPATIENT
Start: 2019-03-22

## 2019-05-30 ENCOUNTER — MYC REFILL (OUTPATIENT)
Dept: FAMILY MEDICINE | Facility: CLINIC | Age: 54
End: 2019-05-30

## 2019-05-30 DIAGNOSIS — G44.209 TENSION HEADACHE: ICD-10-CM

## 2019-05-31 ENCOUNTER — MYC REFILL (OUTPATIENT)
Dept: FAMILY MEDICINE | Facility: CLINIC | Age: 54
End: 2019-05-31

## 2019-05-31 DIAGNOSIS — G44.209 TENSION HEADACHE: ICD-10-CM

## 2019-05-31 RX ORDER — HYDROCODONE BITARTRATE AND ACETAMINOPHEN 5; 325 MG/1; MG/1
TABLET ORAL
Qty: 20 TABLET | Refills: 0 | Status: SHIPPED | OUTPATIENT
Start: 2019-05-31

## 2019-06-06 ENCOUNTER — TELEPHONE (OUTPATIENT)
Dept: FAMILY MEDICINE | Facility: CLINIC | Age: 54
End: 2019-06-06

## 2019-06-06 RX ORDER — HYDROCODONE BITARTRATE AND ACETAMINOPHEN 5; 325 MG/1; MG/1
TABLET ORAL
Qty: 20 TABLET | Refills: 0 | OUTPATIENT
Start: 2019-06-06

## 2019-06-06 NOTE — TELEPHONE ENCOUNTER
Panel Management Review      Patient has the following on her problem list:     Depression / Dysthymia review    Patient is due for:  PHQ9      Composite cancer screening  Chart review shows that this patient is due/due soon for the following Pap Smear, Mammogram and Colonoscopy  Summary:    Patient is due/failing the following:   PE/Pap/Labs, PHQ9, Mammogram, Colonoscopy    Action needed:   Patient needs office visit for PE with PAP and FASTING LABS, Patient needs to do PHQ9, and Patient needs referral/order: MAMMOGRAM and COLONOSCOPY    Type of outreach:    Sent letter.    Questions for provider review:    None                                                                                                                                    Margi Lassiter CMA (AAMA)     Chart routed to None.

## 2019-06-06 NOTE — LETTER
June 6, 2019      Linda Terrell  [address] [place] [id number]      Dear Linda Terrell    We care about your health and have reviewed your health plan including your medical conditions, medication list, and lab results.  Based on this review, it is recommended that you follow up regarding the following health topic(s):     -Depression  -Breast Cancer Screening  -Colon Cancer Screening  -Cervical Cancer Screening  -Wellness (Physical) Visit     We recommend you take the following action(s):    -- Questionnaire for depression. Please fill out the attached questionnaire. These questions are about your depression and how you have been feeling in the last 2 weeks.     -- Pap smear. The last pap that we have on file for you was from 02/26/2008. These are recommended every 3 years. Please call our clinic to schedule your pap smear / physical appointment. Please plan to be fasting for 8 to 10 hours prior to this appointment (nothing to eat or drink except water and medications).     -- Mammogram. The last mammogram that we have on file for you was from 12/21/2010.   Please call one of the following numbers to schedule:  ** [facility] [phone] (at [facility] once a month)  [facility] [phone]  [facility] [phone]  [facility] [phone]  [facility] [phone]  [facility] [phone]    -- Colon screen. Colonoscopy or FIT test (take home test). One of these tests is recommended at age 50 to screen for colon cancer.  Please call one of the following numbers to schedule a colonoscopy:  [facility] [phone]  [facility] [phone]  [facility] [phone]  [facility] [phone] (multiple sites, call for locations)  OR....  If you prefer to do a screening that is LESS INVASIVE AND LESS EXPENSIVE there is an test for you! It is called the FIT test. It is a screening test that is done yearly and can be DONE AT HOME! Do  the test at home and mail it in (you don't even have to pay for postage). If you are willing to do this test, we can order the kit for you to  at our clinic. Please call us at [phone] if you need an order for a colonoscopy or FIT testing.    Please call us at [phone] (or use [employer]) to address the above recommendations.     Thank you for trusting [facility] and we appreciate the opportunity to serve you.  We look forward to supporting your healthcare needs in the future.    Healthy Regards,      Your Health Care Team  [facility]

## 2019-07-21 DIAGNOSIS — F51.01 PRIMARY INSOMNIA: ICD-10-CM

## 2019-07-23 RX ORDER — AMITRIPTYLINE HYDROCHLORIDE 75 MG/1
TABLET ORAL
Qty: 90 TABLET | Refills: 0 | Status: SHIPPED | OUTPATIENT
Start: 2019-07-23 | End: 2019-10-23

## 2019-07-23 NOTE — TELEPHONE ENCOUNTER
"Requested Prescriptions   Pending Prescriptions Disp Refills     amitriptyline (ELAVIL) 75 MG tablet [Pharmacy Med Name: AMITRIPTYLINE 75MG TABLETS] 90 tablet 0     Sig: TAKE 1 TABLET(75 MG) BY MOUTH EVERY NIGHT AS NEEDED FOR SLEEP  Last Written Prescription Date:  09/06/2018 #90 x 1  Last filled 06/20/2019  Last office visit: 1/24/2019 DU Suresh   Future Office Visit:  None         Tricyclic Agents ( Annual appt and no PHQ9) Passed - 7/21/2019 10:21 AM        Passed - Blood Pressure under 140/90 in past 12 mos     BP Readings from Last 3 Encounters:   01/24/19 138/86   09/06/18 138/88   08/11/17 134/82                 Passed - Recent (12 mo) or future (30 days) visit within authorizing provider's specialty     Patient had office visit in the last 12 months or has a visit in the next 30 days with authorizing provider or within the authorizing provider's specialty.  See \"Patient Info\" tab in inbasket, or \"Choose Columns\" in Meds & Orders section of the refill encounter.              Passed - Medication is active on med list        Passed - Patient is age 18 or older        Passed - Patient is not pregnant        Passed - No positive pregnancy test on record in past 12 mos          "

## 2019-08-05 NOTE — PATIENT INSTRUCTIONS
Maykel Terrell,    Thank you for allowing [facility] to manage your care.    I wish you well upcoming procedure.     I ordered some blood work, please go to the laboratory to get your laboratory studies.    Please submit your stool card at your earliest convenience.     I ordered a mammogram , please call diagnostic imaging [phone] to schedule your test.    Please allow 1-2 business days for our office to call you in regards to your laboratory/radiological studies.  If not done so, I encourage you to login into [email or web address] to review your results as well.     If you have any questions or concerns, please feel free to call us at [phone].    Sincerely,    Dr. Cox        Before Your Surgery      Call your surgeon if there is any change in your health. This includes signs of a cold or flu (such as a sore throat, runny nose, cough, rash or fever).    Do not smoke, drink alcohol or take over the counter medicine (unless your surgeon or primary care doctor tells you to) for the 24 hours before and after surgery.    If you take prescribed drugs: Follow your doctor s orders about which medicines to take and which to stop until after surgery.    Eating and drinking prior to surgery: follow the instructions from your surgeon    Take a shower or bath the night before surgery. Use the soap your surgeon gave you to gently clean your skin. If you do not have soap from your surgeon, use your regular soap. Do not shave or scrub the surgery site.  Wear clean pajamas and have clean sheets on your bed.

## 2019-08-05 NOTE — PROGRESS NOTES
[facility]  [address] [address]  [phone]  Dept: [phone]    PRE-OP EVALUATION:  Today's date: 2019    Linda Terrell (: 1965) presents for pre-operative evaluation assessment as requested by Dr. Quiñonez.  She requires evaluation and anesthesia risk assessment prior to undergoing surgery/procedure for treatment of foraminal stenosis L5-S1 .    Proposed Surgery/ Procedure: bilateral spinal fusion of L5 and L6  Date of Surgery/ Procedure: 19  Time of Surgery/ Procedure: 10:30am  Hospital/Surgical Facility: [facility]  Fax number: [phone]  Primary Physician: Laquita Suresh  Type of Anesthesia Anticipated: General    Patient has a Health Care Directive or Living Will:  NO    1. NO - Do you have a history of heart attack, stroke, stent, bypass or surgery on an artery in the head, neck, heart or legs?  2. NO - Do you ever have any pain or discomfort in your chest?  3. NO - Do you have a history of  Heart Failure?  4. NO - Are you troubled by shortness of breath when: walking on the level, up a slight hill or at night?  5. YES - Do you currently have a cold, bronchitis or other respiratory infection?  6. NO - Do you have a cough, shortness of breath or wheezing?  7. NO - Do you sometimes get pains in the calves of your legs when you walk?  8. NO - Do you or anyone in your family have previous history of blood clots?  9. NO - Do you or does anyone in your family have a serious bleeding problem such as prolonged bleeding following surgeries or cuts?  10. NO - Have you ever had problems with anemia or been told to take iron pills?  11. NO - Have you had any abnormal blood loss such as black, tarry or bloody stools, or abnormal vaginal bleeding?  12. NO - Have you ever had a blood transfusion?  13. NO - Have you or any of your relatives ever had problems with anesthesia?  14. NO - Do you have sleep apnea, excessive snoring or daytime  drowsiness?  15. NO - Do you have any prosthetic heart valves?  16. NO - Do you have prosthetic joints?  17. NO - Is there any chance that you may be pregnant?      HPI:     HPI related to upcoming procedure: 55 yo female with history of moderate neural foraminal stenosis L5-S1.     See problem list for active medical problems.  Problems all longstanding and stable, except as noted/documented.  See ROS for pertinent symptoms related to these conditions.      MEDICAL HISTORY:     Patient Active Problem List    Diagnosis Date Noted     Controlled substance agreement signed 09/06/2018     Priority: Medium     CSA signed 09/06/18. ([place])  Margi Lassiter CMA (AAMA)         Chronic pain 11/21/2016     Priority: Medium     Patient is followed by OMAR SWIFT NP, APRN CNP for ongoing prescription of pain medication.  All refills should be approved by this provider, or covering partner.    Medication(s): Norco.   Maximum quantity per month: TBD by provider   Clinic visit frequency required: TBD by provider      Controlled substance agreement:  Encounter-Level CSA:     There are no encounter-level csa.        Pain Clinic evaluation in the past: No    DIRE Total Score(s):  No flowsheet data found.    Last [facility] website verification:  TBD by provider    [email or web address]         Panic attack as reaction to stress 12/05/2015     Priority: Medium     Adjustment disorder with depressed mood 10/04/2015     Priority: Medium     Elevated glucose 05/13/2015     Priority: Medium     SI (sacroiliac) joint dysfunction 03/24/2015     Priority: Medium     Insomnia 10/22/2013     Priority: Medium     Tension headache 05/21/2012     Priority: Medium     Consider swimming, physical therapy       24 hour contact handout given 04/17/2012     Priority: Medium     EMERGENCY CARE PLAN  Presenting Problem Signs and Symptoms Treatment Plan    Questions or conerns during clinic hours    I will call the clinic directly     Questions  or vivian outside clinic hours    I will call the 24 hour nurse line at [phone]    Patient needs to schedule an appointment    I will call the 24 hour scheduling team at [phone] or clinic directly    Same day treatment     I will call the clinic first, nurse line if after hours, urgent care and express care if needed                                 CARDIOVASCULAR SCREENING; LDL GOAL LESS THAN 160 10/31/2010     Priority: Medium     Uterovaginal prolapse, incomplete 03/18/2008     Priority: Medium     Panic disorder without agoraphobia 02/06/2008     Priority: Medium     Stable on zoloft        Past Medical History:   Diagnosis Date     Mild major depression (H) 11/5/2009     NO ACTIVE PROBLEMS      Past Surgical History:   Procedure Laterality Date     BUNIONECTOMY RT/LT      left     C TMJ UNSPECIFIED THERAPY      surgery on jaw     TUBAL LIGATION       Current Outpatient Medications   Medication Sig Dispense Refill     amitriptyline (ELAVIL) 75 MG tablet TAKE 1 TABLET(75 MG) BY MOUTH EVERY NIGHT AS NEEDED FOR SLEEP 90 tablet 0     sertraline (ZOLOFT) 50 MG tablet TAKE 1 TABLET(50 MG) BY MOUTH DAILY 90 tablet 0     traMADol (ULTRAM) 50 MG tablet TK 1 TO 2 TS PO Q 4 TO 6 H PRN P  0     HYDROcodone-acetaminophen (NORCO) 5-325 MG per tablet Take 1 tablet at onset of   severe tension headache,  Taking no more than 2 per headache  Try to make them last  For 6-8 weeks (Patient not taking: Reported on 8/6/2019) 20 tablet 0     HYDROcodone-acetaminophen (NORCO) 5-325 MG tablet Take 1 tablet at onset of   severe tension headache,  Taking no more than 2 per headache  Try to make them last  For 6-8 weeks   LAST refill over due to be seen (Patient not taking: Reported on 8/6/2019) 20 tablet 0     HYDROcodone-acetaminophen (NORCO) 5-325 MG tablet Take 1 tablet at onset of   severe tension headache,  Taking no more than 2 per headache  Try to make them last  For 6-8 weeks   LAST refill over due to be seen (Patient not  taking: Reported on 8/6/2019) 20 tablet 0     HYDROcodone-acetaminophen (NORCO) 5-325 MG tablet Take 1 tablet at onset of   severe tension headache,  Taking no more than 2 per headache  Try to make them last  For 6-8 weeks   LAST refill over due to be seen (Patient not taking: Reported on 8/6/2019) 20 tablet 0     LORazepam (ATIVAN) 0.5 MG tablet Take 1 tablet (0.5 mg) by mouth every 8 hours as needed for anxiety Use sparingly  t (Patient not taking: Reported on 8/6/2019) 30 tablet 0     LORazepam (ATIVAN) 0.5 MG tablet Take 1 tablet (0.5 mg) by mouth every 8 hours as needed for anxiety Use sparingly  t (Patient not taking: Reported on 8/6/2019) 30 tablet 0     OTC products: None, except as noted above    Allergies   Allergen Reactions     Codeine Itching     Can take vicodin.      Imitrex [Sumatriptan] Other (See Comments)     Makes her headaches worse.       Latex Allergy: NO    Social History     Tobacco Use     Smoking status: Never Smoker     Smokeless tobacco: Never Used   Substance Use Topics     Alcohol use: Yes     Comment: occasional     History   Drug Use No       REVIEW OF SYSTEMS:   CONSTITUTIONAL: NEGATIVE for fever, chills, change in weight  INTEGUMENTARY/SKIN: NEGATIVE for worrisome rashes, moles or lesions  EYES: NEGATIVE for vision changes or irritation  ENT/MOUTH: NEGATIVE for ear, mouth and throat problems  RESP: NEGATIVE for significant cough or SOB  BREAST: NEGATIVE for masses, tenderness or discharge  CV: NEGATIVE for chest pain, palpitations or peripheral edema  GI: NEGATIVE for nausea, abdominal pain, heartburn, or change in bowel habits  : NEGATIVE for frequency, dysuria, or hematuria  MUSCULOSKELETAL: NEGATIVE for significant arthralgias or myalgia  NEURO: NEGATIVE for weakness, dizziness or paresthesias  ENDOCRINE: NEGATIVE for temperature intolerance, skin/hair changes  HEME: NEGATIVE for bleeding problems  PSYCHIATRIC: NEGATIVE for changes in mood or affect    EXAM:   /84    "Pulse 107   Temp 98.8  F (37.1  C) (Tympanic)   Ht 1.6 m (5' 3\")   Wt 60.3 kg (133 lb)   SpO2 98%   BMI 23.56 kg/m      GENERAL APPEARANCE: healthy, alert and no distress     EYES: EOMI, PERRL     HENT: ear canals and TM's normal and nose and mouth without ulcers or lesions     NECK: no adenopathy, no asymmetry, masses, or scars and thyroid normal to palpation     RESP: lungs clear to auscultation - no rales, rhonchi or wheezes     CV: regular rates and rhythm, normal S1 S2, no S3 or S4 and no murmur, click or rub     ABDOMEN:  soft, nontender, no HSM or masses and bowel sounds normal     MS: extremities normal- no gross deformities noted, no evidence of inflammation in joints, FROM in all extremities.     SKIN: no suspicious lesions or rashes     NEURO: Normal strength and tone, sensory exam grossly normal, mentation intact and speech normal     PSYCH: mentation appears normal. and affect normal/bright     LYMPHATICS: No cervical adenopathy    DIAGNOSTICS:   EKG: Not indicated due to non-vascular surgery and low risk of event (age <65 and without cardiac risk factors)    Recent Labs   Lab Test 05/13/15  1242 05/11/15  1425 03/10/13  1705  07/04/12  1350   HGB  --  15.7 13.2   < > 14.0   PLT  --  261 279   < > 234   NA  --  138  --   --  141   POTASSIUM  --  3.3*  --   --  3.6   CR  --  0.73  --   --  0.67   A1C 5.7  --   --   --   --     < > = values in this interval not displayed.        IMPRESSION:   Reason for surgery/procedure: Bilateral spinal fusion of L5 and L6  Diagnosis/reason for consult: Lumbar stenosis    The proposed surgical procedure is considered INTERMEDIATE risk.    REVISED CARDIAC RISK INDEX  The patient has the following serious cardiovascular risks for perioperative complications such as (MI, PE, VFib and 3  AV Block):  No serious cardiac risks  INTERPRETATION: 0 risks: Class I (very low risk - 0.4% complication rate)    The patient has the following additional risks for perioperative " complications:  No identified additional risks    1. Preop general physical exam  - CBC with platelets and differential  - Basic metabolic panel  (Ca, Cl, CO2, Creat, Gluc, K, Na, BUN)    2. Screen for colon cancer  Patient will submit a FIT test    3. Encounter for screening mammogram for breast cancer  - MA SCREENING DIGITAL BILAT - Future  (s+30); Future    4. Spinal stenosis of lumbar region without neurogenic claudication    RECOMMENDATIONS:     --Consult hospital rounder / IM to assist post-op medical management    --Patient is to take all scheduled medications on the day of surgery EXCEPT for modifications listed below.    APPROVAL GIVEN to proceed with proposed procedure, without further diagnostic evaluation       Signed Electronically by: Mio Cox DO    Copy of this evaluation report is provided to requesting physician.    [facility] Preop Guidelines    Revised Cardiac Risk Index

## 2019-08-06 ENCOUNTER — OFFICE VISIT (OUTPATIENT)
Dept: FAMILY MEDICINE | Facility: CLINIC | Age: 54
End: 2019-08-06
Payer: COMMERCIAL

## 2019-08-06 VITALS
BODY MASS INDEX: 23.57 KG/M2 | DIASTOLIC BLOOD PRESSURE: 84 MMHG | TEMPERATURE: 98.8 F | HEIGHT: 63 IN | HEART RATE: 107 BPM | WEIGHT: 133 LBS | OXYGEN SATURATION: 98 % | SYSTOLIC BLOOD PRESSURE: 130 MMHG

## 2019-08-06 DIAGNOSIS — M48.061 SPINAL STENOSIS OF LUMBAR REGION WITHOUT NEUROGENIC CLAUDICATION: ICD-10-CM

## 2019-08-06 DIAGNOSIS — Z12.11 SCREEN FOR COLON CANCER: ICD-10-CM

## 2019-08-06 DIAGNOSIS — Z01.818 PREOP GENERAL PHYSICAL EXAM: Primary | ICD-10-CM

## 2019-08-06 DIAGNOSIS — Z12.31 ENCOUNTER FOR SCREENING MAMMOGRAM FOR BREAST CANCER: ICD-10-CM

## 2019-08-06 LAB
ANION GAP SERPL CALCULATED.3IONS-SCNC: 6 MMOL/L (ref 3–14)
BASOPHILS # BLD AUTO: 0 10E9/L (ref 0–0.2)
BASOPHILS NFR BLD AUTO: 0.4 %
BUN SERPL-MCNC: 9 MG/DL (ref 7–30)
CALCIUM SERPL-MCNC: 9.3 MG/DL (ref 8.5–10.1)
CHLORIDE SERPL-SCNC: 102 MMOL/L (ref 94–109)
CO2 SERPL-SCNC: 28 MMOL/L (ref 20–32)
CREAT SERPL-MCNC: 0.75 MG/DL (ref 0.52–1.04)
DIFFERENTIAL METHOD BLD: NORMAL
EOSINOPHIL # BLD AUTO: 0.1 10E9/L (ref 0–0.7)
EOSINOPHIL NFR BLD AUTO: 0.8 %
ERYTHROCYTE [DISTWIDTH] IN BLOOD BY AUTOMATED COUNT: 12.3 % (ref 10–15)
GFR SERPL CREATININE-BSD FRML MDRD: >90 ML/MIN/{1.73_M2}
GLUCOSE SERPL-MCNC: 93 MG/DL (ref 70–99)
HCT VFR BLD AUTO: 45.8 % (ref 35–47)
HGB BLD-MCNC: 15.2 G/DL (ref 11.7–15.7)
LYMPHOCYTES # BLD AUTO: 2.6 10E9/L (ref 0.8–5.3)
LYMPHOCYTES NFR BLD AUTO: 33.5 %
MCH RBC QN AUTO: 32.3 PG (ref 26.5–33)
MCHC RBC AUTO-ENTMCNC: 33.2 G/DL (ref 31.5–36.5)
MCV RBC AUTO: 97 FL (ref 78–100)
MONOCYTES # BLD AUTO: 0.4 10E9/L (ref 0–1.3)
MONOCYTES NFR BLD AUTO: 4.5 %
NEUTROPHILS # BLD AUTO: 4.8 10E9/L (ref 1.6–8.3)
NEUTROPHILS NFR BLD AUTO: 60.8 %
PLATELET # BLD AUTO: 263 10E9/L (ref 150–450)
POTASSIUM SERPL-SCNC: 3.8 MMOL/L (ref 3.4–5.3)
RBC # BLD AUTO: 4.71 10E12/L (ref 3.8–5.2)
SODIUM SERPL-SCNC: 136 MMOL/L (ref 133–144)
WBC # BLD AUTO: 7.9 10E9/L (ref 4–11)

## 2019-08-06 PROCEDURE — 85025 COMPLETE CBC W/AUTO DIFF WBC: CPT | Performed by: FAMILY MEDICINE

## 2019-08-06 PROCEDURE — 36415 COLL VENOUS BLD VENIPUNCTURE: CPT | Performed by: FAMILY MEDICINE

## 2019-08-06 PROCEDURE — 99214 OFFICE O/P EST MOD 30 MIN: CPT | Performed by: FAMILY MEDICINE

## 2019-08-06 PROCEDURE — 80048 BASIC METABOLIC PNL TOTAL CA: CPT | Performed by: FAMILY MEDICINE

## 2019-08-06 RX ORDER — TRAMADOL HYDROCHLORIDE 50 MG/1
TABLET ORAL
Refills: 0 | COMMUNITY
Start: 2019-07-31

## 2019-08-06 ASSESSMENT — MIFFLIN-ST. JEOR: SCORE: 1172.41

## 2019-08-06 ASSESSMENT — PATIENT HEALTH QUESTIONNAIRE - PHQ9: SUM OF ALL RESPONSES TO PHQ QUESTIONS 1-9: 3

## 2019-09-02 DIAGNOSIS — F43.21 ADJUSTMENT DISORDER WITH DEPRESSED MOOD: ICD-10-CM

## 2019-09-03 NOTE — TELEPHONE ENCOUNTER
"Requested Prescriptions   Pending Prescriptions Disp Refills     sertraline (ZOLOFT) 50 MG tablet [Pharmacy Med Name: SERTRALINE 50MG TABLETS] 90 tablet 0     Sig: TAKE 1 TABLET(50 MG) BY MOUTH DAILY  Last Written Prescription Date:  01/18/2019 #90 x 0  Last filled 08/05/2019  Last office visit: 8/6/2019 [facility]   Future Office Visit: None         SSRIs Protocol Passed - 9/2/2019  3:59 AM  PHQ-9 SCORE 8/11/2017 9/6/2018 8/6/2019   PHQ-9 Total Score - - -   PHQ-9 Total Score 3 2 3       ELISA-7 SCORE 9/28/2015 8/11/2017 9/6/2018   Total Score - - -   Total Score 17 3 3               Passed - PHQ-9 score less than 5 in past 6 months     Please review last PHQ-9 score.           Passed - Medication is active on med list        Passed - Patient is age 18 or older        Passed - No active pregnancy on record        Passed - No positive pregnancy test in last 12 months        Passed - Recent (6 mo) or future (30 days) visit within the authorizing provider's specialty     Patient had office visit in the last 6 months or has a visit in the next 30 days with authorizing provider or within the authorizing provider's specialty.  See \"Patient Info\" tab in inbasket, or \"Choose Columns\" in Meds & Orders section of the refill encounter.              "

## 2019-09-28 ENCOUNTER — HEALTH MAINTENANCE LETTER (OUTPATIENT)
Age: 54
End: 2019-09-28

## 2019-10-23 DIAGNOSIS — F51.01 PRIMARY INSOMNIA: ICD-10-CM

## 2019-10-23 NOTE — TELEPHONE ENCOUNTER
"Requested Prescriptions   Pending Prescriptions Disp Refills     amitriptyline (ELAVIL) 75 MG tablet [Pharmacy Med Name: AMITRIPTYLINE 75MG TABLETS]  Last Written Prescription Date:  7/23/19  Last Fill Quantity: 90,  # refills: 0   Last office visit: 8/6/2019 with prescribing provider:  alyson   Future Office Visit:     90 tablet 0     Sig: TAKE 1 TABLET(75 MG) BY MOUTH EVERY NIGHT AS NEEDED FOR SLEEP       Tricyclic Agents ( Annual appt and no PHQ9) Passed - 10/23/2019  9:15 AM        Passed - Blood Pressure under 140/90 in past 12 mos     BP Readings from Last 3 Encounters:   08/06/19 130/84   01/24/19 138/86   09/06/18 138/88                 Passed - Recent (12 mo) or future (30 days) visit within authorizing provider's specialty     Patient has had an office visit with the authorizing provider or a provider within the authorizing providers department within the previous 12 mos or has a future within next 30 days. See \"Patient Info\" tab in inbasket, or \"Choose Columns\" in Meds & Orders section of the refill encounter.              Passed - Medication is active on med list        Passed - Patient is age 18 or older        Passed - Patient is not pregnant        Passed - No positive pregnancy test on record in past 12 mos          "

## 2019-10-25 RX ORDER — AMITRIPTYLINE HYDROCHLORIDE 75 MG/1
TABLET ORAL
Qty: 90 TABLET | Refills: 1 | Status: SHIPPED | OUTPATIENT
Start: 2019-10-25

## 2019-11-07 ENCOUNTER — TELEPHONE (OUTPATIENT)
Dept: LAB | Facility: CLINIC | Age: 54
End: 2019-11-07

## 2019-12-06 ENCOUNTER — TELEPHONE (OUTPATIENT)
Dept: FAMILY MEDICINE | Facility: CLINIC | Age: 54
End: 2019-12-06

## 2019-12-06 NOTE — TELEPHONE ENCOUNTER
Panel Management Review      Patient has the following on her problem list: None      Composite cancer screening  Chart review shows that this patient is due/due soon for the following Pap Smear, Mammogram and Colonoscopy  Summary:    Patient is due/failing the following:   PE/Pap, Labs, Mammo, Shingrix, Colonoscopy    Action needed:   Patient needs office visit for PE with PAP, FASTING LABS and updated IMMUNIZATION and Patient needs referral/order: MAMMOGRAM, COLONOSCOPY    Type of outreach:    Sent letter.    Questions for provider review:    None                                                                                                                                    Margi Lassiter CMA (AAMA)       Chart routed to none .

## 2019-12-06 NOTE — LETTER
December 6, 2019      Linda Terrell  [address] [place] [id number]      Dear Linda Terrell    We care about your health and have reviewed your health plan including your medical conditions, medication list, and lab results.  Based on this review, it is recommended that you follow up regarding the following health topic(s):     -Breast Cancer Screening  -Colon Cancer Screening  -Cervical Cancer Screening  -Wellness (Physical) Visit     We recommend you take the following action(s):    -- Shingles vaccine. This is recommended for your age group. To receive this vaccine please call to schedule a nurse appointment or, if it is more convenient for you, our pharmacy takes walk ins for this also.     -- Pap smear. The last pap that we have on file for you was from 02/06/2008. These are recommended every 3 years. Please call our clinic to schedule your pap smear / physical appointment with fasting lab work. Please plan to be fasting for 8 to 10 hours prior to this appointment (nothing to eat or drink except water and medications).     -- Mammogram. The last mammogram that we have on file for you was from 12/21/2010.   Please call one of the following numbers to schedule:  ** [facility] [phone] (at [facility] once a month)  [facility] [phone]  [facility] [phone]  [facility] [phone]  [facility] [phone]  [facility] [phone]    -- Colon screen. Colonoscopy or FIT test (take home test). One of these tests is recommended at age 50 to screen for colon cancer.  Please call one of the following numbers to schedule a colonoscopy:  [facility] [phone]  [facility] [phone]  [facility] [phone]  [facility] [phone] (multiple sites, call for locations)  OR....  If you prefer to do a screening that is LESS INVASIVE AND LESS EXPENSIVE there is an test for you! It is called the FIT test. It is a  screening test that is done yearly and can be DONE AT HOME! Do the test at home and mail it in (you don't even have to pay for postage). If you are willing to do this test, we can order the kit for you to  at our clinic. Please call us at [phone] if you need an order for a colonoscopy or FIT testing.    Please call us at [phone] (or use [employer]) to address the above recommendations.     Thank you for trusting [facility] and we appreciate the opportunity to serve you.  We look forward to supporting your healthcare needs in the future.    Healthy Regards,      Your Health Care Team  [facility]

## 2021-01-10 ENCOUNTER — HEALTH MAINTENANCE LETTER (OUTPATIENT)
Age: 56
End: 2021-01-10

## 2021-10-23 ENCOUNTER — HEALTH MAINTENANCE LETTER (OUTPATIENT)
Age: 56
End: 2021-10-23

## 2022-02-12 ENCOUNTER — HEALTH MAINTENANCE LETTER (OUTPATIENT)
Age: 57
End: 2022-02-12

## 2022-03-08 ENCOUNTER — APPOINTMENT (OUTPATIENT)
Dept: URBAN - METROPOLITAN AREA CLINIC 254 | Age: 57
Setting detail: DERMATOLOGY
End: 2022-03-10

## 2022-03-08 VITALS — WEIGHT: 127 LBS | RESPIRATION RATE: 17 BRPM | HEIGHT: 65 IN

## 2022-03-08 DIAGNOSIS — L91.8 OTHER HYPERTROPHIC DISORDERS OF THE SKIN: ICD-10-CM

## 2022-03-08 DIAGNOSIS — L82.1 OTHER SEBORRHEIC KERATOSIS: ICD-10-CM

## 2022-03-08 DIAGNOSIS — D485 NEOPLASM OF UNCERTAIN BEHAVIOR OF SKIN: ICD-10-CM

## 2022-03-08 DIAGNOSIS — D22 MELANOCYTIC NEVI: ICD-10-CM

## 2022-03-08 DIAGNOSIS — B07.8 OTHER VIRAL WARTS: ICD-10-CM

## 2022-03-08 PROBLEM — D22.5 MELANOCYTIC NEVI OF TRUNK: Status: ACTIVE | Noted: 2022-03-08

## 2022-03-08 PROBLEM — D48.5 NEOPLASM OF UNCERTAIN BEHAVIOR OF SKIN: Status: ACTIVE | Noted: 2022-03-08

## 2022-03-08 PROCEDURE — OTHER COUNSELING: OTHER

## 2022-03-08 PROCEDURE — 99203 OFFICE O/P NEW LOW 30 MIN: CPT | Mod: 25

## 2022-03-08 PROCEDURE — 11300 SHAVE SKIN LESION 0.5 CM/<: CPT | Mod: 59

## 2022-03-08 PROCEDURE — 11102 TANGNTL BX SKIN SINGLE LES: CPT | Mod: 59

## 2022-03-08 PROCEDURE — OTHER BIOPSY BY SHAVE METHOD: OTHER

## 2022-03-08 PROCEDURE — OTHER SHAVE REMOVAL: OTHER

## 2022-03-08 PROCEDURE — 17110 DESTRUCT B9 LESION 1-14: CPT

## 2022-03-08 PROCEDURE — OTHER BENIGN DESTRUCTION: OTHER

## 2022-03-08 ASSESSMENT — LOCATION DETAILED DESCRIPTION DERM
LOCATION DETAILED: INFERIOR THORACIC SPINE
LOCATION DETAILED: RIGHT UPPER CUTANEOUS LIP
LOCATION DETAILED: RIGHT MEDIAL BREAST 2-3:00 REGION
LOCATION DETAILED: RIGHT MEDIAL BREAST 4-5:00 REGION
LOCATION DETAILED: LEFT INFERIOR MEDIAL UPPER BACK
LOCATION DETAILED: RIGHT MEDIAL MANDIBULAR CHEEK

## 2022-03-08 ASSESSMENT — LOCATION ZONE DERM
LOCATION ZONE: FACE
LOCATION ZONE: TRUNK
LOCATION ZONE: LIP

## 2022-03-08 ASSESSMENT — LOCATION SIMPLE DESCRIPTION DERM
LOCATION SIMPLE: RIGHT BREAST
LOCATION SIMPLE: RIGHT CHEEK
LOCATION SIMPLE: LEFT UPPER BACK
LOCATION SIMPLE: UPPER BACK
LOCATION SIMPLE: RIGHT LIP

## 2022-03-08 NOTE — PROCEDURE: BENIGN DESTRUCTION
Duration Of Freeze Thaw-Cycle (Seconds): 2
Anesthesia Type: 2% lidocaine with epinephrine
Anesthesia Volume In Cc: 0.5
Treatment Number (Will Not Render If 0): 0
Add 52 Modifier (Optional): no
Post-Care Instructions: I reviewed with the patient in detail post-care instructions. Patient is to wear sunprotection, and avoid picking at any of the treated lesions. Pt may apply Vaseline to crusted or scabbing areas.
Medical Necessity Information: It is in your best interest to select a reason for this procedure from the list below. All of these items fulfill various CMS LCD requirements except the new and changing color options.
Medical Necessity Clause: This procedure was medically necessary because the lesions that were treated were:
Number Of Freeze-Thaw Cycles: 3 freeze-thaw cycles
Detail Level: Detailed
Consent: The patient's consent was obtained including but not limited to risks of crusting, scabbing, blistering, scarring, darker or lighter pigmentary change, recurrence, incomplete removal and infection.

## 2022-03-08 NOTE — PROCEDURE: SHAVE REMOVAL
Anesthesia Type: 1% lidocaine with epinephrine
Biopsy Method: Dermablade
Wound Care: Petrolatum
Consent was obtained from the patient. The risks and benefits to therapy were discussed in detail. Specifically, the risks of infection, scarring, bleeding, prolonged wound healing, incomplete removal, allergy to anesthesia, nerve injury and recurrence were addressed. Prior to the procedure, the treatment site was clearly identified and confirmed by the patient. All components of Universal Protocol/PAUSE Rule completed.
Size Of Lesion In Cm (Required): 0.5
Render Post-Care Instructions In Note?: yes
Medical Necessity Information: It is in your best interest to select a reason for this procedure from the list below. All of these items fulfill various CMS LCD requirements except the new and changing color options.
Bill For Surgical Tray: no
Hemostasis: Drysol
Medical Necessity Clause: This procedure was medically necessary because the lesion that was treated was:
Post-Care Instructions: I reviewed with the patient in detail post-care instructions. Patient is to keep the biopsy site dry overnight, and then apply bacitracin twice daily until healed. Patient may apply hydrogen peroxide soaks to remove any crusting.
Billing Type: Third-Party Bill
Notification Instructions: Patient will be notified of pathology results. However, patient instructed to call the office if not contacted within 2 weeks.
Detail Level: Detailed
X Size Of Lesion In Cm (Optional): 0

## 2022-03-08 NOTE — PROCEDURE: BIOPSY BY SHAVE METHOD
Cryotherapy Text: The wound bed was treated with cryotherapy after the biopsy was performed.
Depth Of Biopsy: dermis
Hide Topical Anesthesia?: No
Wound Care: Petrolatum
Anesthesia Volume In Cc (Will Not Render If 0): 0.5
Consent: Written consent was obtained and risks were reviewed including but not limited to scarring, infection, bleeding, scabbing, incomplete removal, nerve damage and allergy to anesthesia.
Type Of Destruction Used: Curettage
Size Of Lesion In Cm: 0
Electrodesiccation And Curettage Text: The wound bed was treated with electrodesiccation and curettage after the biopsy was performed.
Anesthesia Type: 1% lidocaine with epinephrine
Information: Selecting Yes will display possible errors in your note based on the variables you have selected. This validation is only offered as a suggestion for you. PLEASE NOTE THAT THE VALIDATION TEXT WILL BE REMOVED WHEN YOU FINALIZE YOUR NOTE. IF YOU WANT TO FAX A PRELIMINARY NOTE YOU WILL NEED TO TOGGLE THIS TO 'NO' IF YOU DO NOT WANT IT IN YOUR FAXED NOTE.
Silver Nitrate Text: The wound bed was treated with silver nitrate after the biopsy was performed.
Biopsy Type: H and E
Curettage Text: The wound bed was treated with curettage after the biopsy was performed.
Post-Care Instructions: I reviewed with the patient in detail post-care instructions. Patient is to keep the biopsy site dry overnight, and then apply bacitracin twice daily until healed. Patient may apply hydrogen peroxide soaks to remove any crusting.
Electrodesiccation Text: The wound bed was treated with electrodesiccation after the biopsy was performed.
Detail Level: Detailed
Hemostasis: Drysol
Biopsy Method: Dermablade
Notification Instructions: Patient will be notified of biopsy results. However, patient instructed to call the office if not contacted within 2 weeks.
Dressing: bandage
Was A Bandage Applied: Yes
Billing Type: Third-Party Bill

## 2022-10-09 ENCOUNTER — HEALTH MAINTENANCE LETTER (OUTPATIENT)
Age: 57
End: 2022-10-09

## 2023-02-18 ENCOUNTER — HEALTH MAINTENANCE LETTER (OUTPATIENT)
Age: 58
End: 2023-02-18

## 2023-08-15 NOTE — MR AVS SNAPSHOT
After Visit Summary   9/6/2018    Linda Terrell    MRN: [id number]           Patient Information     Date Of Birth          1965        Visit Information        Provider Department      9/6/2018 3:40 PM Laquita Suresh APRN [facility] Nehemias Moralez        Today's Diagnoses     Tension headache    -  1    Primary insomnia        Encounter for screening mammogram for malignant neoplasm of breast        CARDIOVASCULAR SCREENING; LDL GOAL LESS THAN 160        Need for hepatitis C screening test        Special screening for malignant neoplasms, colon        Elevated glucose          Care Instructions    Please get your cancer screenings done! Please schedule a physical with pap smear, mammogram and colonoscopy or complete a FIT test.      You will need to make a lab only appointment,  [phone].   Or any of the [facility].       Exercise 30-60 min daily     Stay well hydrated - urine should be clear.     Write down your thoughts before you go to bed and tell yourself that you know where the information is, you can get it when you need it and you don't have to worry about it now.    Tell yourself that you are going to fall a sleep,  Sleep all night and wake rested in the morning,  Tell this to yourself at least 8 times before going to bed.  Do your deep breathing  Breath in to the count of 3, hold your breath to the count of 3 and out to the count of 3.  Repeat this 2-3 times.    If you wake during the night repeat this.      For the anxiety   -- figure out the  Triggers for your anxiety and then figure out coping mechanisms             Follow-ups after your visit        Future tests that were ordered for you today     Open Future Orders        Priority Expected Expires Ordered    Fecal colorectal cancer screen (FIT) Routine 9/27/2018 11/29/2018 9/6/2018    Lipid panel reflex to direct LDL Fasting Routine 9/6/2018 9/6/2019 9/6/2018    **Comprehensive metabolic panel FUTURE 1yr     08/15/23 0900   Plan   Plan Team conference held today.  Pt is pending SNF placement.  Attempted to contact [facility] [phone] with no answer.  SS will try to call again.        "Routine 8/7/2019 9/6/2019 9/6/2018    MA Screening Digital Bilateral Routine  9/7/2019 9/6/2018    **Hepatitis C Screen Reflex to RNA FUTURE anytime Routine 9/6/2018 9/6/2019 9/6/2018            Who to contact     Normal or non-critical lab and imaging results will be communicated to you by [employer], letter or phone within 4 business days after the clinic has received the results. If you do not hear from us within 7 days, please contact the clinic through [employer] or phone. If you have a critical or abnormal lab result, we will notify you by phone as soon as possible.  Submit refill requests through [employer] or call your pharmacy and they will forward the refill request to us. Please allow 3 business days for your refill to be completed.          If you need to speak with a  for additional information , please call: [phone]           Additional Information About Your Visit        Springshot Information     Spr[employer]hot gives you secure access to your electronic health record. If you see a primary care provider, you can also send messages to your care team and make appointments. If you have questions, please call your primary care clinic.  If you do not have a primary care provider, please call [phone] and they will assist you.        Care EveryWhere ID     This is your Care EveryWhere ID. This could be used by other organizations to access your [place] medical records  [id number]        Your Vitals Were     Pulse Temperature Height BMI (Body Mass Index)          68 98.7  F (37.1  C) (Tympanic) 5' 3.5\" (1.613 m) 23.78 kg/m2         Blood Pressure from Last 3 Encounters:   09/06/18 138/88   08/11/17 134/82   07/06/16 115/70    Weight from Last 3 Encounters:   09/06/18 136 lb 6.4 oz (61.9 kg)   08/11/17 140 lb 9.6 oz (63.8 kg)   07/06/16 145 lb (65.8 kg)              We Performed the Following     DEPRESSION ACTION PLAN (DAP)          Today's Medication Changes          These changes are accurate as " of 9/6/18  4:45 PM.  If you have any questions, ask your nurse or doctor.               These medicines have changed or have updated prescriptions.        Dose/Directions    amitriptyline 75 MG tablet   Commonly known as:  ELAVIL   This may have changed:  additional instructions   Used for:  Primary insomnia   Changed by:  Laquita Suresh APRN CNP        Dose:  75 mg   Take 1 tablet (75 mg) by mouth nightly as needed for sleep   Quantity:  90 tablet   Refills:  1       * HYDROcodone-acetaminophen 5-325 MG per tablet   Commonly known as:  NORCO   This may have changed:  You were already taking a medication with the same name, and this prescription was added. Make sure you understand how and when to take each.   Used for:  Tension headache   Changed by:  Laquita Suresh APRN CNP        Take 1 tablet at onset of   severe tension headache,  Taking no more than 2 per headache  Try to make them last  For 6-8 weeks   Quantity:  20 tablet   Refills:  0       * HYDROcodone-acetaminophen 5-325 MG per tablet   Commonly known as:  NORCO   This may have changed:  These instructions start on 11/1/2018. If you are unsure what to do until then, ask your doctor or other care provider.   Used for:  Tension headache   Changed by:  Laquita Suresh APRN CNP        Start taking on:  11/1/2018   Take 1 tablet at onset of   severe tension headache,  Taking no more than 2 per headache  Try to make them last  For 6-8 weeks   LAST refill over due to be seen   Quantity:  20 tablet   Refills:  0       * Notice:  This list has 2 medication(s) that are the same as other medications prescribed for you. Read the directions carefully, and ask your doctor or other care provider to review them with you.         Where to get your medicines      [facility] [address] [place] [address] [address] [facility]  [address], [facility] [address]      Phone:  [phone]     amitriptyline 75 MG tablet         Some of these will need a paper prescription and others can be bought over the counter.  Ask your nurse if you have questions.     Bring a paper prescription for each of these medications     HYDROcodone-acetaminophen 5-325 MG per tablet    HYDROcodone-acetaminophen 5-325 MG per tablet               Information about OPIOIDS     PRESCRIPTION OPIOIDS: WHAT YOU NEED TO KNOW   We gave you an opioid (narcotic) pain medicine. It is important to manage your pain, but opioids are not always the best choice. You should first try all the other options your care team gave you. Take this medicine for as short a time (and as few doses) as possible.    Some activities can increase your pain, such as bandage changes or therapy sessions. It may help to take your pain medicine 30 to 60 minutes before these activities. Reduce your stress by getting enough sleep, working on hobbies you enjoy and practicing relaxation or meditation. Talk to your care team about ways to manage your pain beyond prescription opioids.    These medicines have risks:    DO NOT drive when on new or higher doses of pain medicine. These medicines can affect your alertness and reaction times, and you could be arrested for driving under the influence (DUI). If you need to use opioids long-term, talk to your care team about driving.    DO NOT operate heavy machinery    DO NOT do any other dangerous activities while taking these medicines.    DO NOT drink any alcohol while taking these medicines.     If the opioid prescribed includes acetaminophen, DO NOT take with any other medicines that contain acetaminophen. Read all labels carefully. Look for the word  acetaminophen  or  Tylenol.  Ask your pharmacist if you have questions or are unsure.    You can get addicted to pain medicines, especially if you have a history of addiction (chemical, alcohol or substance dependence). Talk to your care team about  ways to reduce this risk.    All opioids tend to cause constipation. Drink plenty of water and eat foods that have a lot of fiber, such as fruits, vegetables, prune juice, apple juice and high-fiber cereal. Take a laxative (Miralax, milk of magnesia, Colace, Senna) if you don t move your bowels at least every other day. Other side effects include upset stomach, sleepiness, dizziness, throwing up, tolerance (needing more of the medicine to have the same effect), physical dependence and slowed breathing.    Store your pills in a secure place, locked if possible. We will not replace any lost or stolen medicine. If you don t finish your medicine, please throw away (dispose) as directed by your pharmacist. The [place] [employer]ution [employer] has more information about safe disposal: [email or web address]-medications         Primary Care Provider Office Phone # Fax #    AUBREY Mendes [facility] [phone] [address] [place] [address]        Equal Access to Services     [facility]: Hadii aad ku hadasho Soomaali, waaxda luqadaha, qaybta kaalmada adeegyamichel, katelin peters . So [facility] [phone].    ATENCIÓN: Si habla español, tiene a cleaning disposición servicios gratuitos de asistencia lingüística. Llame al [phone].    We comply with applicable federal civil rights laws and [place] laws. We do not discriminate on the basis of race, color, national origin, age, disability, sex, sexual orientation, or gender identity.            Thank you!     Thank you for choosing [facility] [place]  for your care. Our goal is always to provide you with excellent care. Hearing back from our patients is one way we can continue to improve our services. Please take a few minutes to complete the written survey that you may receive in the mail after your visit with us. Thank you!             Your Updated Medication List - Protect  others around you: Learn how to safely use, store and throw away your medicines at www.disposemymeds.org.          This list is accurate as of 9/6/18  4:45 PM.  Always use your most recent med list.                   Brand Name Dispense Instructions for use Diagnosis    amitriptyline 75 MG tablet    ELAVIL    90 tablet    Take 1 tablet (75 mg) by mouth nightly as needed for sleep    Primary insomnia       * HYDROcodone-acetaminophen 5-325 MG per tablet    NORCO    20 tablet    Take 1 tablet at onset of   severe tension headache,  Taking no more than 2 per headache  Try to make them last  For 6-8 weeks    Tension headache       * HYDROcodone-acetaminophen 5-325 MG per tablet   Start taking on:  11/1/2018    NORCO    20 tablet    Take 1 tablet at onset of   severe tension headache,  Taking no more than 2 per headache  Try to make them last  For 6-8 weeks   LAST refill over due to be seen    Tension headache       * Notice:  This list has 2 medication(s) that are the same as other medications prescribed for you. Read the directions carefully, and ask your doctor or other care provider to review them with you.

## 2023-10-28 ENCOUNTER — HEALTH MAINTENANCE LETTER (OUTPATIENT)
Age: 58
End: 2023-10-28

## 2024-03-16 ENCOUNTER — HEALTH MAINTENANCE LETTER (OUTPATIENT)
Age: 59
End: 2024-03-16

## 2025-08-20 ENCOUNTER — HOSPITAL ENCOUNTER (EMERGENCY)
Facility: CLINIC | Age: 60
Discharge: HOME | End: 2025-08-20
Attending: EMERGENCY MEDICINE
Payer: COMMERCIAL

## 2025-08-20 VITALS
RESPIRATION RATE: 16 BRPM | DIASTOLIC BLOOD PRESSURE: 98 MMHG | WEIGHT: 110 LBS | OXYGEN SATURATION: 98 % | BODY MASS INDEX: 18.33 KG/M2 | HEIGHT: 65 IN | SYSTOLIC BLOOD PRESSURE: 152 MMHG | TEMPERATURE: 98.5 F | HEART RATE: 110 BPM

## 2025-08-20 DIAGNOSIS — J32.9 SINUSITIS, UNSPECIFIED CHRONICITY, UNSPECIFIED LOCATION: ICD-10-CM

## 2025-08-20 DIAGNOSIS — K08.89 PAIN, DENTAL: Primary | ICD-10-CM

## 2025-08-20 LAB
ANION GAP SERPL CALCULATED.3IONS-SCNC: 8 MMOL/L (ref 7–15)
BASOPHILS # BLD AUTO: 0.04 10E3/UL (ref 0–0.2)
BASOPHILS NFR BLD AUTO: 0.6 %
BUN SERPL-MCNC: 16.5 MG/DL (ref 8–23)
CALCIUM SERPL-MCNC: 9.1 MG/DL (ref 8.8–10.4)
CHLORIDE SERPL-SCNC: 103 MMOL/L (ref 98–107)
CREAT SERPL-MCNC: 0.8 MG/DL (ref 0.51–0.95)
CRP SERPL-MCNC: <3 MG/L
EGFRCR SERPLBLD CKD-EPI 2021: 84 ML/MIN/1.73M2
EOSINOPHIL # BLD AUTO: 0.03 10E3/UL (ref 0–0.7)
EOSINOPHIL NFR BLD AUTO: 0.4 %
ERYTHROCYTE [DISTWIDTH] IN BLOOD BY AUTOMATED COUNT: 12.6 % (ref 10–15)
GLUCOSE SERPL-MCNC: 133 MG/DL (ref 70–99)
HCO3 SERPL-SCNC: 27 MMOL/L (ref 22–29)
HCT VFR BLD AUTO: 42.2 % (ref 35–47)
HGB BLD-MCNC: 14 G/DL (ref 11.7–15.7)
IMM GRANULOCYTES # BLD: <0.03 10E3/UL
IMM GRANULOCYTES NFR BLD: 0.3 %
LYMPHOCYTES # BLD AUTO: 1.74 10E3/UL (ref 0.8–5.3)
LYMPHOCYTES NFR BLD AUTO: 25.1 %
MCH RBC QN AUTO: 31.2 PG (ref 26.5–33)
MCHC RBC AUTO-ENTMCNC: 33.2 G/DL (ref 31.5–36.5)
MCV RBC AUTO: 94 FL (ref 78–100)
MONOCYTES # BLD AUTO: 0.3 10E3/UL (ref 0–1.3)
MONOCYTES NFR BLD AUTO: 4.3 %
NEUTROPHILS # BLD AUTO: 4.8 10E3/UL (ref 1.6–8.3)
NEUTROPHILS NFR BLD AUTO: 69.3 %
NRBC # BLD AUTO: <0.03 10E3/UL
NRBC BLD AUTO-RTO: 0 /100
PLATELET # BLD AUTO: 275 10E3/UL (ref 150–450)
POTASSIUM SERPL-SCNC: 4.1 MMOL/L (ref 3.4–5.3)
RBC # BLD AUTO: 4.49 10E6/UL (ref 3.8–5.2)
SODIUM SERPL-SCNC: 138 MMOL/L (ref 135–145)
WBC # BLD AUTO: 6.93 10E3/UL (ref 4–11)

## 2025-08-20 PROCEDURE — 85004 AUTOMATED DIFF WBC COUNT: CPT | Performed by: EMERGENCY MEDICINE

## 2025-08-20 PROCEDURE — 36415 COLL VENOUS BLD VENIPUNCTURE: CPT | Performed by: EMERGENCY MEDICINE

## 2025-08-20 PROCEDURE — 86140 C-REACTIVE PROTEIN: CPT | Performed by: EMERGENCY MEDICINE

## 2025-08-20 PROCEDURE — 80048 BASIC METABOLIC PNL TOTAL CA: CPT | Performed by: EMERGENCY MEDICINE

## 2025-08-20 PROCEDURE — 250N000011 HC RX IP 250 OP 636: Performed by: EMERGENCY MEDICINE

## 2025-08-20 PROCEDURE — 250N000013 HC RX MED GY IP 250 OP 250 PS 637: Performed by: EMERGENCY MEDICINE

## 2025-08-20 PROCEDURE — 258N000003 HC RX IP 258 OP 636: Performed by: EMERGENCY MEDICINE

## 2025-08-20 PROCEDURE — 99284 EMERGENCY DEPT VISIT MOD MDM: CPT | Mod: 25 | Performed by: EMERGENCY MEDICINE

## 2025-08-20 RX ORDER — HYDROCODONE BITARTRATE AND ACETAMINOPHEN 5; 325 MG/1; MG/1
1 TABLET ORAL ONCE
Refills: 0 | Status: COMPLETED | OUTPATIENT
Start: 2025-08-20 | End: 2025-08-20

## 2025-08-20 RX ORDER — IBUPROFEN 600 MG/1
600 TABLET, FILM COATED ORAL EVERY 6 HOURS PRN
Qty: 30 TABLET | Refills: 0 | Status: SHIPPED | OUTPATIENT
Start: 2025-08-20

## 2025-08-20 RX ORDER — TRIAMCINOLONE ACETONIDE 55 UG/1
2 SPRAY, METERED NASAL DAILY
Qty: 6.8 ML | Refills: 0 | Status: SHIPPED | OUTPATIENT
Start: 2025-08-20

## 2025-08-20 RX ORDER — KETOROLAC TROMETHAMINE 15 MG/ML
15 INJECTION, SOLUTION INTRAMUSCULAR; INTRAVENOUS ONCE
Status: COMPLETED | OUTPATIENT
Start: 2025-08-20 | End: 2025-08-20

## 2025-08-20 RX ADMIN — SODIUM CHLORIDE 1000 ML: 0.9 INJECTION, SOLUTION INTRAVENOUS at 18:10

## 2025-08-20 RX ADMIN — KETOROLAC TROMETHAMINE 15 MG: 15 INJECTION, SOLUTION INTRAMUSCULAR; INTRAVENOUS at 18:14

## 2025-08-20 RX ADMIN — HYDROCODONE BITARTRATE AND ACETAMINOPHEN 1 TABLET: 5; 325 TABLET ORAL at 19:18

## 2025-08-20 ASSESSMENT — COLUMBIA-SUICIDE SEVERITY RATING SCALE - C-SSRS
6. HAVE YOU EVER DONE ANYTHING, STARTED TO DO ANYTHING, OR PREPARED TO DO ANYTHING TO END YOUR LIFE?: NO
2. HAVE YOU ACTUALLY HAD ANY THOUGHTS OF KILLING YOURSELF IN THE PAST MONTH?: NO
1. IN THE PAST MONTH, HAVE YOU WISHED YOU WERE DEAD OR WISHED YOU COULD GO TO SLEEP AND NOT WAKE UP?: NO

## 2025-08-20 ASSESSMENT — ACTIVITIES OF DAILY LIVING (ADL)
ADLS_ACUITY_SCORE: 41
ADLS_ACUITY_SCORE: 41

## 2025-08-21 ENCOUNTER — HOSPITAL ENCOUNTER (EMERGENCY)
Facility: CLINIC | Age: 60
Discharge: HOME | End: 2025-08-22
Attending: STUDENT IN AN ORGANIZED HEALTH CARE EDUCATION/TRAINING PROGRAM | Admitting: STUDENT IN AN ORGANIZED HEALTH CARE EDUCATION/TRAINING PROGRAM
Payer: COMMERCIAL

## 2025-08-21 VITALS
RESPIRATION RATE: 18 BRPM | HEART RATE: 93 BPM | SYSTOLIC BLOOD PRESSURE: 159 MMHG | OXYGEN SATURATION: 98 % | TEMPERATURE: 97.3 F | DIASTOLIC BLOOD PRESSURE: 103 MMHG

## 2025-08-21 DIAGNOSIS — R51.9 ACUTE NONINTRACTABLE HEADACHE, UNSPECIFIED HEADACHE TYPE: Primary | ICD-10-CM

## 2025-08-21 DIAGNOSIS — R25.1 SHAKING: ICD-10-CM

## 2025-08-21 PROCEDURE — 99285 EMERGENCY DEPT VISIT HI MDM: CPT | Mod: 25 | Performed by: STUDENT IN AN ORGANIZED HEALTH CARE EDUCATION/TRAINING PROGRAM

## 2025-08-21 ASSESSMENT — COLUMBIA-SUICIDE SEVERITY RATING SCALE - C-SSRS
1. IN THE PAST MONTH, HAVE YOU WISHED YOU WERE DEAD OR WISHED YOU COULD GO TO SLEEP AND NOT WAKE UP?: NO
2. HAVE YOU ACTUALLY HAD ANY THOUGHTS OF KILLING YOURSELF IN THE PAST MONTH?: NO
6. HAVE YOU EVER DONE ANYTHING, STARTED TO DO ANYTHING, OR PREPARED TO DO ANYTHING TO END YOUR LIFE?: NO

## 2025-08-22 ENCOUNTER — APPOINTMENT (OUTPATIENT)
Dept: CT IMAGING | Facility: CLINIC | Age: 60
End: 2025-08-22
Attending: STUDENT IN AN ORGANIZED HEALTH CARE EDUCATION/TRAINING PROGRAM
Payer: COMMERCIAL

## 2025-08-22 LAB
ALBUMIN SERPL BCG-MCNC: 4.3 G/DL (ref 3.5–5.2)
ALP SERPL-CCNC: 93 U/L (ref 40–150)
ALT SERPL W P-5'-P-CCNC: 20 U/L (ref 0–50)
ANION GAP SERPL CALCULATED.3IONS-SCNC: 13 MMOL/L (ref 7–15)
AST SERPL W P-5'-P-CCNC: 15 U/L (ref 0–45)
BASOPHILS # BLD AUTO: 0.04 10E3/UL (ref 0–0.2)
BASOPHILS NFR BLD AUTO: 0.5 %
BILIRUB SERPL-MCNC: 0.3 MG/DL
BUN SERPL-MCNC: 11 MG/DL (ref 8–23)
CALCIUM SERPL-MCNC: 9.2 MG/DL (ref 8.8–10.4)
CHLORIDE SERPL-SCNC: 104 MMOL/L (ref 98–107)
CREAT SERPL-MCNC: 0.68 MG/DL (ref 0.51–0.95)
CRP SERPL-MCNC: <3 MG/L
EGFRCR SERPLBLD CKD-EPI 2021: >90 ML/MIN/1.73M2
EOSINOPHIL # BLD AUTO: 0.04 10E3/UL (ref 0–0.7)
EOSINOPHIL NFR BLD AUTO: 0.5 %
ERYTHROCYTE [DISTWIDTH] IN BLOOD BY AUTOMATED COUNT: 12.6 % (ref 10–15)
GLUCOSE SERPL-MCNC: 108 MG/DL (ref 70–99)
HCO3 SERPL-SCNC: 25 MMOL/L (ref 22–29)
HCT VFR BLD AUTO: 42 % (ref 35–47)
HGB BLD-MCNC: 13.7 G/DL (ref 11.7–15.7)
IMM GRANULOCYTES # BLD: 0.03 10E3/UL
IMM GRANULOCYTES NFR BLD: 0.4 %
LYMPHOCYTES # BLD AUTO: 2.09 10E3/UL (ref 0.8–5.3)
LYMPHOCYTES NFR BLD AUTO: 28.3 %
MAGNESIUM SERPL-MCNC: 2.3 MG/DL (ref 1.7–2.3)
MCH RBC QN AUTO: 30.9 PG (ref 26.5–33)
MCHC RBC AUTO-ENTMCNC: 32.6 G/DL (ref 31.5–36.5)
MCV RBC AUTO: 94.6 FL (ref 78–100)
MONOCYTES # BLD AUTO: 0.34 10E3/UL (ref 0–1.3)
MONOCYTES NFR BLD AUTO: 4.6 %
NEUTROPHILS # BLD AUTO: 4.85 10E3/UL (ref 1.6–8.3)
NEUTROPHILS NFR BLD AUTO: 65.7 %
NRBC # BLD AUTO: <0.03 10E3/UL
NRBC BLD AUTO-RTO: 0 /100
PLATELET # BLD AUTO: 253 10E3/UL (ref 150–450)
POTASSIUM SERPL-SCNC: 3.6 MMOL/L (ref 3.4–5.3)
PROT SERPL-MCNC: 6.8 G/DL (ref 6.4–8.3)
RBC # BLD AUTO: 4.44 10E6/UL (ref 3.8–5.2)
SODIUM SERPL-SCNC: 142 MMOL/L (ref 135–145)
TSH SERPL DL<=0.005 MIU/L-ACNC: 0.91 UIU/ML (ref 0.3–4.2)
WBC # BLD AUTO: 7.39 10E3/UL (ref 4–11)

## 2025-08-22 PROCEDURE — 255N000002 HC RX 255 OP 636: Performed by: STUDENT IN AN ORGANIZED HEALTH CARE EDUCATION/TRAINING PROGRAM

## 2025-08-22 PROCEDURE — 70491 CT SOFT TISSUE NECK W/DYE: CPT

## 2025-08-22 PROCEDURE — 96374 THER/PROPH/DIAG INJ IV PUSH: CPT | Mod: 59

## 2025-08-22 PROCEDURE — 84443 ASSAY THYROID STIM HORMONE: CPT | Performed by: STUDENT IN AN ORGANIZED HEALTH CARE EDUCATION/TRAINING PROGRAM

## 2025-08-22 PROCEDURE — 36415 COLL VENOUS BLD VENIPUNCTURE: CPT | Performed by: STUDENT IN AN ORGANIZED HEALTH CARE EDUCATION/TRAINING PROGRAM

## 2025-08-22 PROCEDURE — 250N000009 HC RX 250: Performed by: STUDENT IN AN ORGANIZED HEALTH CARE EDUCATION/TRAINING PROGRAM

## 2025-08-22 PROCEDURE — 250N000011 HC RX IP 250 OP 636: Performed by: STUDENT IN AN ORGANIZED HEALTH CARE EDUCATION/TRAINING PROGRAM

## 2025-08-22 PROCEDURE — 83735 ASSAY OF MAGNESIUM: CPT | Performed by: STUDENT IN AN ORGANIZED HEALTH CARE EDUCATION/TRAINING PROGRAM

## 2025-08-22 PROCEDURE — 84155 ASSAY OF PROTEIN SERUM: CPT | Performed by: STUDENT IN AN ORGANIZED HEALTH CARE EDUCATION/TRAINING PROGRAM

## 2025-08-22 PROCEDURE — 96375 TX/PRO/DX INJ NEW DRUG ADDON: CPT

## 2025-08-22 PROCEDURE — 85025 COMPLETE CBC W/AUTO DIFF WBC: CPT | Performed by: STUDENT IN AN ORGANIZED HEALTH CARE EDUCATION/TRAINING PROGRAM

## 2025-08-22 PROCEDURE — 86140 C-REACTIVE PROTEIN: CPT | Performed by: STUDENT IN AN ORGANIZED HEALTH CARE EDUCATION/TRAINING PROGRAM

## 2025-08-22 PROCEDURE — 70450 CT HEAD/BRAIN W/O DYE: CPT

## 2025-08-22 RX ORDER — KETOROLAC TROMETHAMINE 15 MG/ML
15 INJECTION, SOLUTION INTRAMUSCULAR; INTRAVENOUS ONCE
Status: COMPLETED | OUTPATIENT
Start: 2025-08-22 | End: 2025-08-22

## 2025-08-22 RX ORDER — HYDROMORPHONE HYDROCHLORIDE 1 MG/ML
0.5 INJECTION, SOLUTION INTRAMUSCULAR; INTRAVENOUS; SUBCUTANEOUS
Refills: 0 | Status: COMPLETED | OUTPATIENT
Start: 2025-08-22 | End: 2025-08-22

## 2025-08-22 RX ORDER — DROPERIDOL 2.5 MG/ML
2.5 INJECTION, SOLUTION INTRAMUSCULAR; INTRAVENOUS ONCE
Status: COMPLETED | OUTPATIENT
Start: 2025-08-22 | End: 2025-08-22

## 2025-08-22 RX ORDER — DIPHENHYDRAMINE HYDROCHLORIDE 50 MG/ML
50 INJECTION, SOLUTION INTRAMUSCULAR; INTRAVENOUS ONCE
Status: COMPLETED | OUTPATIENT
Start: 2025-08-22 | End: 2025-08-22

## 2025-08-22 RX ADMIN — SODIUM CHLORIDE 80 ML: 9 INJECTION, SOLUTION INTRAVENOUS at 01:12

## 2025-08-22 RX ADMIN — MIDAZOLAM 2 MG: 1 INJECTION INTRAMUSCULAR; INTRAVENOUS at 01:43

## 2025-08-22 RX ADMIN — DROPERIDOL 2.5 MG: 2.5 INJECTION, SOLUTION INTRAMUSCULAR; INTRAVENOUS at 00:56

## 2025-08-22 RX ADMIN — HYDROMORPHONE HYDROCHLORIDE 0.5 MG: 1 INJECTION, SOLUTION INTRAMUSCULAR; INTRAVENOUS; SUBCUTANEOUS at 03:24

## 2025-08-22 RX ADMIN — KETOROLAC TROMETHAMINE 15 MG: 15 INJECTION, SOLUTION INTRAMUSCULAR; INTRAVENOUS at 00:55

## 2025-08-22 RX ADMIN — DIPHENHYDRAMINE HYDROCHLORIDE 50 MG: 50 INJECTION INTRAMUSCULAR; INTRAVENOUS at 01:43

## 2025-08-22 RX ADMIN — IOHEXOL 95 ML: 350 INJECTION, SOLUTION INTRAVENOUS at 01:12

## 2025-08-22 ASSESSMENT — ACTIVITIES OF DAILY LIVING (ADL)
ADLS_ACUITY_SCORE: 41

## 2025-08-25 ENCOUNTER — PATIENT OUTREACH (OUTPATIENT)
Dept: CARE COORDINATION | Facility: CLINIC | Age: 60
End: 2025-08-25
Payer: COMMERCIAL

## 2025-08-27 ENCOUNTER — PATIENT OUTREACH (OUTPATIENT)
Dept: CARE COORDINATION | Facility: CLINIC | Age: 60
End: 2025-08-27
Payer: COMMERCIAL